# Patient Record
Sex: FEMALE | Race: WHITE | NOT HISPANIC OR LATINO
[De-identification: names, ages, dates, MRNs, and addresses within clinical notes are randomized per-mention and may not be internally consistent; named-entity substitution may affect disease eponyms.]

---

## 2018-07-16 DIAGNOSIS — Z82.3 FAMILY HISTORY OF STROKE: ICD-10-CM

## 2018-07-16 DIAGNOSIS — Z82.49 FAMILY HISTORY OF ISCHEMIC HEART DISEASE AND OTHER DISEASES OF THE CIRCULATORY SYSTEM: ICD-10-CM

## 2018-07-16 DIAGNOSIS — Z86.59 PERSONAL HISTORY OF OTHER MENTAL AND BEHAVIORAL DISORDERS: ICD-10-CM

## 2018-07-20 ENCOUNTER — NON-APPOINTMENT (OUTPATIENT)
Age: 73
End: 2018-07-20

## 2018-07-20 ENCOUNTER — APPOINTMENT (OUTPATIENT)
Dept: CARDIOLOGY | Facility: CLINIC | Age: 73
End: 2018-07-20

## 2018-07-20 VITALS
WEIGHT: 188 LBS | OXYGEN SATURATION: 96 % | HEIGHT: 67 IN | RESPIRATION RATE: 12 BRPM | HEART RATE: 61 BPM | SYSTOLIC BLOOD PRESSURE: 141 MMHG | TEMPERATURE: 97.6 F | BODY MASS INDEX: 29.51 KG/M2 | DIASTOLIC BLOOD PRESSURE: 76 MMHG

## 2018-07-20 DIAGNOSIS — Z86.69 PERSONAL HISTORY OF OTHER DISEASES OF THE NERVOUS SYSTEM AND SENSE ORGANS: ICD-10-CM

## 2018-07-27 ENCOUNTER — APPOINTMENT (OUTPATIENT)
Dept: CARDIOLOGY | Facility: CLINIC | Age: 73
End: 2018-07-27

## 2018-07-27 VITALS
SYSTOLIC BLOOD PRESSURE: 158 MMHG | OXYGEN SATURATION: 97 % | HEART RATE: 71 BPM | WEIGHT: 188 LBS | TEMPERATURE: 97.9 F | DIASTOLIC BLOOD PRESSURE: 81 MMHG | BODY MASS INDEX: 29.51 KG/M2 | HEIGHT: 67 IN

## 2018-08-10 ENCOUNTER — APPOINTMENT (OUTPATIENT)
Dept: CARDIOLOGY | Facility: CLINIC | Age: 73
End: 2018-08-10

## 2018-08-12 LAB
ANION GAP SERPL CALC-SCNC: 14 MMOL/L
BASOPHILS # BLD AUTO: 0.01 K/UL
BASOPHILS NFR BLD AUTO: 0.2 %
BUN SERPL-MCNC: 14 MG/DL
CALCIUM SERPL-MCNC: 9.6 MG/DL
CHLORIDE SERPL-SCNC: 105 MMOL/L
CO2 SERPL-SCNC: 24 MMOL/L
CREAT SERPL-MCNC: 0.86 MG/DL
EOSINOPHIL # BLD AUTO: 0.04 K/UL
EOSINOPHIL NFR BLD AUTO: 0.9 %
GLUCOSE SERPL-MCNC: 106 MG/DL
HCT VFR BLD CALC: 41.2 %
HGB BLD-MCNC: 13.1 G/DL
IMM GRANULOCYTES NFR BLD AUTO: 0.2 %
INR PPP: 1.02 RATIO
LYMPHOCYTES # BLD AUTO: 1.35 K/UL
LYMPHOCYTES NFR BLD AUTO: 29.9 %
MAN DIFF?: NORMAL
MCHC RBC-ENTMCNC: 27.8 PG
MCHC RBC-ENTMCNC: 31.8 GM/DL
MCV RBC AUTO: 87.5 FL
MONOCYTES # BLD AUTO: 0.39 K/UL
MONOCYTES NFR BLD AUTO: 8.6 %
NEUTROPHILS # BLD AUTO: 2.72 K/UL
NEUTROPHILS NFR BLD AUTO: 60.2 %
PLATELET # BLD AUTO: 216 K/UL
POTASSIUM SERPL-SCNC: 4.7 MMOL/L
PT BLD: 11.5 SEC
RBC # BLD: 4.71 M/UL
RBC # FLD: 14.6 %
SODIUM SERPL-SCNC: 143 MMOL/L
WBC # FLD AUTO: 4.52 K/UL

## 2018-08-23 ENCOUNTER — APPOINTMENT (OUTPATIENT)
Dept: CARDIOLOGY | Facility: CLINIC | Age: 73
End: 2018-08-23

## 2018-08-28 ENCOUNTER — TRANSCRIPTION ENCOUNTER (OUTPATIENT)
Age: 73
End: 2018-08-28

## 2018-09-05 ENCOUNTER — MESSAGE (OUTPATIENT)
Age: 73
End: 2018-09-05

## 2018-09-11 ENCOUNTER — APPOINTMENT (OUTPATIENT)
Dept: CARDIOLOGY | Facility: CLINIC | Age: 73
End: 2018-09-11

## 2018-09-11 VITALS
SYSTOLIC BLOOD PRESSURE: 122 MMHG | WEIGHT: 189 LBS | RESPIRATION RATE: 12 BRPM | HEART RATE: 54 BPM | BODY MASS INDEX: 29.6 KG/M2 | OXYGEN SATURATION: 98 % | DIASTOLIC BLOOD PRESSURE: 74 MMHG

## 2018-09-17 ENCOUNTER — TRANSCRIPTION ENCOUNTER (OUTPATIENT)
Age: 73
End: 2018-09-17

## 2018-10-24 ENCOUNTER — MESSAGE (OUTPATIENT)
Age: 73
End: 2018-10-24

## 2018-10-25 ENCOUNTER — MESSAGE (OUTPATIENT)
Age: 73
End: 2018-10-25

## 2018-10-29 ENCOUNTER — MESSAGE (OUTPATIENT)
Age: 73
End: 2018-10-29

## 2018-12-14 ENCOUNTER — MESSAGE (OUTPATIENT)
Age: 73
End: 2018-12-14

## 2018-12-15 ENCOUNTER — TRANSCRIPTION ENCOUNTER (OUTPATIENT)
Age: 73
End: 2018-12-15

## 2018-12-16 ENCOUNTER — RX RENEWAL (OUTPATIENT)
Age: 73
End: 2018-12-16

## 2018-12-20 ENCOUNTER — MESSAGE (OUTPATIENT)
Age: 73
End: 2018-12-20

## 2018-12-26 ENCOUNTER — TRANSCRIPTION ENCOUNTER (OUTPATIENT)
Age: 73
End: 2018-12-26

## 2018-12-31 ENCOUNTER — TRANSCRIPTION ENCOUNTER (OUTPATIENT)
Age: 73
End: 2018-12-31

## 2019-01-10 ENCOUNTER — MESSAGE (OUTPATIENT)
Age: 74
End: 2019-01-10

## 2019-02-01 ENCOUNTER — APPOINTMENT (OUTPATIENT)
Dept: PULMONOLOGY | Facility: CLINIC | Age: 74
End: 2019-02-01
Payer: MEDICARE

## 2019-02-01 VITALS
HEIGHT: 67 IN | DIASTOLIC BLOOD PRESSURE: 78 MMHG | SYSTOLIC BLOOD PRESSURE: 114 MMHG | WEIGHT: 196 LBS | OXYGEN SATURATION: 97 % | HEART RATE: 70 BPM | BODY MASS INDEX: 30.76 KG/M2

## 2019-02-01 PROCEDURE — 99203 OFFICE O/P NEW LOW 30 MIN: CPT

## 2019-02-01 RX ORDER — CHLORHEXIDINE GLUCONATE 4 %
1000 LIQUID (ML) TOPICAL
Refills: 0 | Status: ACTIVE | COMMUNITY

## 2019-02-01 NOTE — REVIEW OF SYSTEMS
[Fatigue] : fatigue [Recent Wt Gain (___ Lbs)] : recent [unfilled] ~Ulb weight gain [Snoring] : snoring [Nocturia] : nocturia [Depression] : depression [Difficulty Maintaining Sleep] : difficulty maintaining sleep [Negative] : Psychiatric [EDS: ESS=____] : no daytime somnolence [Unusual Sleep Behavior] : no unusual sleep behavior [Hypersomnolence] : not sleeping much more than usual [Cataplexy] :  no cataplexy

## 2019-02-01 NOTE — ASSESSMENT
[FreeTextEntry1] : APAP [X]  30 day [] 90 day compliance Dates:  [1/1-1/30] \par Settings: [10] cm F[]  Airfit Touch [] small []Med []  Lg . [X]  Nasal Wisp\par %compliance:[100]  \par Pressure:  95thile%  [10]cm max []cm\par Average use:  [8:18] \par Leak: [] L/ min,  max []L/ min   [] L/ hrs  [X]0% large leak\par AHI = [0]! \par \par

## 2019-02-01 NOTE — HISTORY OF PRESENT ILLNESS
[Obstructive Sleep Apnea] : obstructive sleep apnea [Snoring] : snoring [Witnessed Apneas] : witnessed sleep apnea [Frequent Nocturnal Awakening] : frequent nocturnal awakening [FreeTextEntry1] : PSG/CPAP 2013 not currently available

## 2019-02-01 NOTE — PHYSICAL EXAM
[Well Groomed] : well groomed [General Appearance - In No Acute Distress] : no acute distress [Elongated Uvula] : elongated uvula [Enlarged Base of the Tongue] : enlargement of the base of the tongue [Erythema] : erythema of the pharynx [Retrognathia] : retrognathia [IV] : IV [Neck Appearance] : the appearance of the neck was normal [Neck Cervical Mass (___cm)] : no neck mass was observed [Jugular Venous Distention Increased] : there was no jugular-venous distention [Apical Impulse] : the apical impulse was normal [Heart Rate And Rhythm] : heart rate was normal and rhythm regular [Heart Sounds] : normal S1 and S2 [] : no respiratory distress [Respiration, Rhythm And Depth] : normal respiratory rhythm and effort [Exaggerated Use Of Accessory Muscles For Inspiration] : no accessory muscle use [Tenderness: ____] : tenderness [unfilled] [Abnormal Walk] : normal gait [Nail Clubbing] : no clubbing of the fingernails [Cyanosis, Localized] : no localized cyanosis [Skin Color & Pigmentation] : normal skin color and pigmentation [Cranial Nerves] : cranial nerves 2-12 were intact [Sensation] : the sensory exam was normal to light touch and pinprick [No Focal Deficits] : no focal deficits [Oriented To Time, Place, And Person] : oriented to person, place, and time [Affect] : the affect was normal [Memory Recent] : recent memory was not impaired [FreeTextEntry1] : OBese [FreeTextEntry2] : trace pretibial

## 2019-02-06 ENCOUNTER — APPOINTMENT (OUTPATIENT)
Dept: CARDIOLOGY | Facility: CLINIC | Age: 74
End: 2019-02-06
Payer: MEDICARE

## 2019-02-06 ENCOUNTER — NON-APPOINTMENT (OUTPATIENT)
Age: 74
End: 2019-02-06

## 2019-02-06 VITALS
DIASTOLIC BLOOD PRESSURE: 80 MMHG | BODY MASS INDEX: 29.82 KG/M2 | RESPIRATION RATE: 12 BRPM | TEMPERATURE: 98.2 F | WEIGHT: 190 LBS | HEIGHT: 67 IN | HEART RATE: 60 BPM | SYSTOLIC BLOOD PRESSURE: 130 MMHG | OXYGEN SATURATION: 97 %

## 2019-02-06 DIAGNOSIS — R55 SYNCOPE AND COLLAPSE: ICD-10-CM

## 2019-02-06 DIAGNOSIS — R94.31 ABNORMAL ELECTROCARDIOGRAM [ECG] [EKG]: ICD-10-CM

## 2019-02-06 LAB — EPWORTH SCORE: 1

## 2019-02-06 PROCEDURE — 93298 REM INTERROG DEV EVAL SCRMS: CPT

## 2019-02-06 PROCEDURE — 99214 OFFICE O/P EST MOD 30 MIN: CPT

## 2019-02-06 NOTE — REVIEW OF SYSTEMS
[Palpitations] : palpitations [Negative] : Constitutional [Shortness Of Breath] : no shortness of breath [Dyspnea on exertion] : not dyspnea during exertion [Chest Pain] : no chest pain [Lower Ext Edema] : no extremity edema [Leg Claudication] : no intermittent leg claudication

## 2019-02-06 NOTE — ASSESSMENT
[FreeTextEntry1] : 72 yo female with paroxysmal atrial fibrillation, and Medtronic LINQ loop recorder (implanted 8/23/18 for evaluation of recurrent palpitations and near syncopal episodes). Patient reports intermittent transient episodes of palpitations which resolve after several hours and appear to be associated with periods of stress. \par \par ECG today demonstrated sinus bradycardia with HR 59 bpm, RSR' pattern in V1, possible LA enlargement, and Q waves in inferior leads and V4-6, which are suggestive of possible inferior infarct and anterolateral infarct.\par Lexiscan nuclear stress test on 8/2/18 demonstrated a small area of mild apical inferior ischemia. Given absence of exertional complaints, will continue to monitor clinically for now.\par \par Patient underwent implant of Medtronic LINQ loop recorder on 8/23/18 for further evaluation of her recurrent episodes of palpitations/near syncope in the past. \par Remote interrogation of her loop recorder today through [employer] online portal demonstrated episode of PACs and paroxysm of atrial fibrillation on 1/19/19 which correlated to her symptoms of palpitations. Battery life is OK.\par Will continue current medical management of atrial fibrillation with sotalol 120 mg po bid. QTc on ECG today was normal. \par Will check labs to reassess renal function and lytes.\par Will continue Pradaxa 150 mg po bid for afib thromboembolic prophylaxis. Will check CBC. \par If patient should have persistent or more frequent and prolonged episodes of atrial fibrillation, will consider referral to EP for adjustment of her antiarrhythmic regimen or evaluation for afib ablation.\par \par RTC in 6 months

## 2019-02-06 NOTE — HISTORY OF PRESENT ILLNESS
[FreeTextEntry1] : 74 yo female with paroxysmal atrial fibrillation, and Medtronic LINQ loop recorder (implanted 8/23/18 for evaluation of recurrent palpitations and near syncopal episodes). Patient presents today for follow-up. She reports intermittent episodes of palpitations about once per month since the fall, but denies syncope. These episodes last for several hours before resolving and seem to be associated with periods of stress. She denies chest pain, dyspnea, edema, melena, hematochezia, or hematemesis. She reports also reports intermittent episodes of facial flushing about once per month. She denies nausea, vomiting, or diarrhea.\par \par PMD: Brii Brink MD

## 2019-02-06 NOTE — PHYSICAL EXAM
[General Appearance - Well Developed] : well developed [General Appearance - Well Nourished] : well nourished [General Appearance - In No Acute Distress] : no acute distress [Normal Conjunctiva] : the conjunctiva exhibited no abnormalities [] : no respiratory distress [Respiration, Rhythm And Depth] : normal respiratory rhythm and effort [Auscultation Breath Sounds / Voice Sounds] : lungs were clear to auscultation bilaterally [Bowel Sounds] : normal bowel sounds [Abnormal Walk] : normal gait [Nail Clubbing] : no clubbing of the fingernails [Cyanosis, Localized] : no localized cyanosis [Oriented To Time, Place, And Person] : oriented to person, place, and time [Affect] : the affect was normal [Mood] : the mood was normal [Rhythm Regular] : regular [Normal S1] : normal S1 [Normal S2] : normal S2 [No Murmur] : no murmurs heard [No Pitting Edema] : no pitting edema present [No Oral Cyanosis] : no oral cyanosis [Bradycardia] : bradycardic [FreeTextEntry1] : No JVD present [S3] : no S3 [S4] : no S4 [Right Carotid Bruit] : no bruit heard over the right carotid [Left Carotid Bruit] : no bruit heard over the left carotid

## 2019-02-06 NOTE — REASON FOR VISIT
[Follow-Up - Clinic] : a clinic follow-up of [Atrial Fibrillation] : atrial fibrillation [Palpitations] : palpitations

## 2019-02-07 LAB
ALBUMIN SERPL ELPH-MCNC: 4.6 G/DL
ALP BLD-CCNC: 76 U/L
ALT SERPL-CCNC: 16 U/L
ANION GAP SERPL CALC-SCNC: 15 MMOL/L
AST SERPL-CCNC: 19 U/L
BASOPHILS # BLD AUTO: 0.02 K/UL
BASOPHILS NFR BLD AUTO: 0.3 %
BILIRUB SERPL-MCNC: 0.2 MG/DL
BUN SERPL-MCNC: 22 MG/DL
CALCIUM SERPL-MCNC: 9.4 MG/DL
CHLORIDE SERPL-SCNC: 105 MMOL/L
CO2 SERPL-SCNC: 23 MMOL/L
CREAT SERPL-MCNC: 1.01 MG/DL
EOSINOPHIL # BLD AUTO: 0.07 K/UL
EOSINOPHIL NFR BLD AUTO: 1.1 %
GLUCOSE SERPL-MCNC: 102 MG/DL
HCT VFR BLD CALC: 41.3 %
HGB BLD-MCNC: 13.2 G/DL
IMM GRANULOCYTES NFR BLD AUTO: 0 %
LYMPHOCYTES # BLD AUTO: 1.78 K/UL
LYMPHOCYTES NFR BLD AUTO: 28 %
MAGNESIUM SERPL-MCNC: 2 MG/DL
MAN DIFF?: NORMAL
MCHC RBC-ENTMCNC: 28 PG
MCHC RBC-ENTMCNC: 32 GM/DL
MCV RBC AUTO: 87.5 FL
MONOCYTES # BLD AUTO: 0.62 K/UL
MONOCYTES NFR BLD AUTO: 9.8 %
NEUTROPHILS # BLD AUTO: 3.86 K/UL
NEUTROPHILS NFR BLD AUTO: 60.8 %
PLATELET # BLD AUTO: 217 K/UL
POTASSIUM SERPL-SCNC: 4.4 MMOL/L
PROT SERPL-MCNC: 7 G/DL
RBC # BLD: 4.72 M/UL
RBC # FLD: 13.5 %
SODIUM SERPL-SCNC: 143 MMOL/L
WBC # FLD AUTO: 6.35 K/UL

## 2019-03-08 ENCOUNTER — MESSAGE (OUTPATIENT)
Age: 74
End: 2019-03-08

## 2019-03-22 ENCOUNTER — CLINICAL ADVICE (OUTPATIENT)
Age: 74
End: 2019-03-22

## 2019-04-23 ENCOUNTER — RESULT CHARGE (OUTPATIENT)
Age: 74
End: 2019-04-23

## 2019-05-03 ENCOUNTER — APPOINTMENT (OUTPATIENT)
Dept: GASTROENTEROLOGY | Facility: CLINIC | Age: 74
End: 2019-05-03
Payer: MEDICARE

## 2019-05-03 VITALS
DIASTOLIC BLOOD PRESSURE: 78 MMHG | BODY MASS INDEX: 30.61 KG/M2 | SYSTOLIC BLOOD PRESSURE: 110 MMHG | HEART RATE: 64 BPM | WEIGHT: 195 LBS | HEIGHT: 67 IN

## 2019-05-03 DIAGNOSIS — R19.5 OTHER FECAL ABNORMALITIES: ICD-10-CM

## 2019-05-03 PROCEDURE — 99203 OFFICE O/P NEW LOW 30 MIN: CPT

## 2019-05-03 NOTE — HISTORY OF PRESENT ILLNESS
[de-identified] : CHEMA LYNN  is being evaluated at the request of Dr. Brink for an opinion re: positive cologaurd / colon cancer screening.  Denies nausea, vomiting , fever, chills , diarrhea, constipation, BRBPR, reflux\par

## 2019-05-03 NOTE — PHYSICAL EXAM
[General Appearance - Alert] : alert [Outer Ear] : the ears and nose were normal in appearance [General Appearance - In No Acute Distress] : in no acute distress [Sclera] : the sclera and conjunctiva were normal [] : no respiratory distress [Neck Appearance] : the appearance of the neck was normal [FreeTextEntry1] : deferred [Abnormal Walk] : normal gait [Skin Color & Pigmentation] : normal skin color and pigmentation [Abdomen Soft] : soft [No Focal Deficits] : no focal deficits [Oriented To Time, Place, And Person] : oriented to person, place, and time

## 2019-05-07 ENCOUNTER — NON-APPOINTMENT (OUTPATIENT)
Age: 74
End: 2019-05-07

## 2019-05-07 ENCOUNTER — APPOINTMENT (OUTPATIENT)
Dept: PULMONOLOGY | Facility: CLINIC | Age: 74
End: 2019-05-07
Payer: MEDICARE

## 2019-05-07 ENCOUNTER — APPOINTMENT (OUTPATIENT)
Dept: CARDIOLOGY | Facility: CLINIC | Age: 74
End: 2019-05-07
Payer: MEDICARE

## 2019-05-07 VITALS
WEIGHT: 197 LBS | SYSTOLIC BLOOD PRESSURE: 123 MMHG | BODY MASS INDEX: 30.85 KG/M2 | RESPIRATION RATE: 12 BRPM | DIASTOLIC BLOOD PRESSURE: 69 MMHG | HEART RATE: 72 BPM | OXYGEN SATURATION: 95 %

## 2019-05-07 VITALS
OXYGEN SATURATION: 96 % | HEART RATE: 60 BPM | BODY MASS INDEX: 30.92 KG/M2 | DIASTOLIC BLOOD PRESSURE: 70 MMHG | SYSTOLIC BLOOD PRESSURE: 112 MMHG | WEIGHT: 197 LBS | HEIGHT: 67 IN

## 2019-05-07 DIAGNOSIS — Z86.39 PERSONAL HISTORY OF OTHER ENDOCRINE, NUTRITIONAL AND METABOLIC DISEASE: ICD-10-CM

## 2019-05-07 PROCEDURE — 36415 COLL VENOUS BLD VENIPUNCTURE: CPT

## 2019-05-07 PROCEDURE — 99214 OFFICE O/P EST MOD 30 MIN: CPT

## 2019-05-07 PROCEDURE — 99213 OFFICE O/P EST LOW 20 MIN: CPT | Mod: 25

## 2019-05-07 PROCEDURE — 93000 ELECTROCARDIOGRAM COMPLETE: CPT

## 2019-05-07 NOTE — ASSESSMENT
[FreeTextEntry1] : 74 yo female with paroxysmal atrial fibrillation, and Medtronic LINQ loop recorder (implanted 8/23/18 for evaluation of recurrent palpitations and near syncopal episodes). Patient continues to report intermittent transient episodes of palpitations.\par \par ECG today demonstrated sinus rhythm with RSR' pattern in V1, possible LA enlargement, and Q waves in inferior leads and V4-6, which are suggestive of possible inferolateral infarct. However, this is unchanged compared to 2/6/19 or 7/20/18 ECGs. Lexiscan nuclear stress test on 8/2/18 demonstrated a small area of mild apical inferior ischemia. \par Given absence of exertional complaints, will continue to monitor clinically for now.\par \par Patient underwent implant of Medtronic LINQ loop recorder on 8/23/18 for further evaluation of her recurrent episodes of palpitations/near syncope in the past. \par Remote interrogation of her loop recorder today and on 4/23/19 through [employer] online portal demonstrated no significant episodes of arrhythmias following her last paroxysm of atrial fibrillation on 1/19/19.\par Suspect that her current symptoms may be due to stress/anxiety given absence of arrhythmias despite multiple reported episodes of palpitations over the past several months, but no correlation with arrhythmias per interrogation of loop recorder. \par Patient was encouraged to seek evaluation/management for possible anxiety disorder.\par Stress management was also advised.\par \par Will continue current medical management of atrial fibrillation with sotalol 120 mg po bid. QTc on ECG today was normal. \par Will check labs to reassess CBC, renal function, lytes, TSH, and free T4.\par Will continue Pradaxa 150 mg po bid for afib thromboembolic prophylaxis.\par If patient should have persistent or more frequent and prolonged episodes of atrial fibrillation, will consider referral to EP for adjustment of her antiarrhythmic regimen or evaluation for afib ablation.\par \par Will check vitamin D levels today given history of vitamin D deficiency.

## 2019-05-07 NOTE — REASON FOR VISIT
[Follow-Up - Clinic] : a clinic follow-up of [Atrial Fibrillation] : atrial fibrillation [Palpitations] : palpitations [FreeTextEntry1] : 7

## 2019-05-07 NOTE — PHYSICAL EXAM
[General Appearance - In No Acute Distress] : no acute distress [Well Groomed] : well groomed [Elongated Uvula] : elongated uvula [FreeTextEntry1] : OBese [Enlarged Base of the Tongue] : enlargement of the base of the tongue [Erythema] : erythema of the pharynx [Neck Appearance] : the appearance of the neck was normal [IV] : IV [Retrognathia] : retrognathia [Heart Rate And Rhythm] : heart rate was normal and rhythm regular [Apical Impulse] : the apical impulse was normal [Heart Sounds] : normal S1 and S2 [] : no respiratory distress [Exaggerated Use Of Accessory Muscles For Inspiration] : no accessory muscle use [Respiration, Rhythm And Depth] : normal respiratory rhythm and effort [Abdomen Soft] : soft [Tenderness: ____] : tenderness [unfilled] [Abnormal Walk] : normal gait [Nail Clubbing] : no clubbing of the fingernails [Cyanosis, Localized] : no localized cyanosis [FreeTextEntry2] : trace pretibial [Skin Color & Pigmentation] : normal skin color and pigmentation [Cranial Nerves] : cranial nerves 2-12 were intact [No Focal Deficits] : no focal deficits [Sensation] : the sensory exam was normal to light touch and pinprick [Oriented To Time, Place, And Person] : oriented to person, place, and time [Affect] : the affect was normal [Memory Recent] : recent memory was not impaired

## 2019-05-07 NOTE — PHYSICAL EXAM
[General Appearance - Well Developed] : well developed [General Appearance - In No Acute Distress] : no acute distress [General Appearance - Well Nourished] : well nourished [No Oral Cyanosis] : no oral cyanosis [Normal Conjunctiva] : the conjunctiva exhibited no abnormalities [] : no respiratory distress [Respiration, Rhythm And Depth] : normal respiratory rhythm and effort [Auscultation Breath Sounds / Voice Sounds] : lungs were clear to auscultation bilaterally [Bowel Sounds] : normal bowel sounds [Nail Clubbing] : no clubbing of the fingernails [Abnormal Walk] : normal gait [Cyanosis, Localized] : no localized cyanosis [Oriented To Time, Place, And Person] : oriented to person, place, and time [Affect] : the affect was normal [Mood] : the mood was normal [Rhythm Regular] : regular [Bradycardia] : bradycardic [Normal S1] : normal S1 [Normal S2] : normal S2 [No Murmur] : no murmurs heard [No Pitting Edema] : no pitting edema present [FreeTextEntry1] : No JVD present [S3] : no S3 [Right Carotid Bruit] : no bruit heard over the right carotid [S4] : no S4 [Left Carotid Bruit] : no bruit heard over the left carotid

## 2019-05-07 NOTE — REVIEW OF SYSTEMS
[Fatigue] : fatigue [Recent Wt Gain (___ Lbs)] : recent [unfilled] ~Ulb weight gain [EDS: ESS=____] : no daytime somnolence [Snoring] : snoring [Nocturia] : nocturia [Depression] : depression [Difficulty Maintaining Sleep] : difficulty maintaining sleep [Hypersomnolence] : not sleeping much more than usual [Unusual Sleep Behavior] : no unusual sleep behavior [Cataplexy] :  no cataplexy [Negative] : Gastrointestinal

## 2019-05-07 NOTE — HISTORY OF PRESENT ILLNESS
[FreeTextEntry1] : 74 yo female with paroxysmal atrial fibrillation, and Medtronic LINQ loop recorder (implanted 8/23/18 for evaluation of recurrent palpitations and near syncopal episodes). Patient presents today for follow-up. She continues to report intermittent palpitations with associated lightheadedness, but denies syncope. She continues to report chronic stress. She denies chest pain, dyspnea, edema, melena, hematochezia, or hematemesis. \par \par PMD: Brii Brink MD

## 2019-05-07 NOTE — ASSESSMENT
[FreeTextEntry1] : above was discussed at length with the patient was an excellent understanding of the issues.

## 2019-05-07 NOTE — REVIEW OF SYSTEMS
[Palpitations] : palpitations [Negative] : Heme/Lymph [Shortness Of Breath] : no shortness of breath [Chest Pain] : no chest pain [Dyspnea on exertion] : not dyspnea during exertion [Lower Ext Edema] : no extremity edema [Leg Claudication] : no intermittent leg claudication [Anxiety] : anxiety [Under Stress] : under stress

## 2019-05-07 NOTE — HISTORY OF PRESENT ILLNESS
[FreeTextEntry1] : The patient returns for followup of her NIR. He is feeling well and likes her new machine. It looks nicer and quieter. She is sleeping anywhere from 7.5-8 hours per night and no longer feels tired during the daytime. She has not lost any weight although she is committed. her original PSG from [facility] [place] is now available.  [Obstructive Sleep Apnea] : obstructive sleep apnea [Witnessed Apneas] : witnessed sleep apnea [Snoring] : snoring [Date: ___] : Date of most recent diagnostic polysomnogram: [unfilled] [Frequent Nocturnal Awakening] : frequent nocturnal awakening [Marshall desatuation%: ___] : Marshall desaturation:  [unfilled]% [AHI: ___ per hour] : Apnea-hypopnea index:  [unfilled] per hour

## 2019-05-08 ENCOUNTER — MESSAGE (OUTPATIENT)
Age: 74
End: 2019-05-08

## 2019-05-08 LAB
24R-OH-CALCIDIOL SERPL-MCNC: 75.1 PG/ML
ALBUMIN SERPL ELPH-MCNC: 4.5 G/DL
ALP BLD-CCNC: 78 U/L
ALT SERPL-CCNC: 22 U/L
ANION GAP SERPL CALC-SCNC: 15 MMOL/L
AST SERPL-CCNC: 22 U/L
BASOPHILS # BLD AUTO: 0.05 K/UL
BASOPHILS NFR BLD AUTO: 0.8 %
BILIRUB SERPL-MCNC: 0.4 MG/DL
BUN SERPL-MCNC: 13 MG/DL
CALCIUM SERPL-MCNC: 9.6 MG/DL
CHLORIDE SERPL-SCNC: 107 MMOL/L
CO2 SERPL-SCNC: 23 MMOL/L
CREAT SERPL-MCNC: 0.77 MG/DL
EOSINOPHIL # BLD AUTO: 0.07 K/UL
EOSINOPHIL NFR BLD AUTO: 1.2 %
GLUCOSE SERPL-MCNC: 119 MG/DL
HCT VFR BLD CALC: 43.4 %
HGB BLD-MCNC: 13.9 G/DL
IMM GRANULOCYTES NFR BLD AUTO: 0.2 %
LYMPHOCYTES # BLD AUTO: 1.6 K/UL
LYMPHOCYTES NFR BLD AUTO: 27.2 %
MAN DIFF?: NORMAL
MCHC RBC-ENTMCNC: 28.4 PG
MCHC RBC-ENTMCNC: 32 GM/DL
MCV RBC AUTO: 88.6 FL
MONOCYTES # BLD AUTO: 0.62 K/UL
MONOCYTES NFR BLD AUTO: 10.5 %
NEUTROPHILS # BLD AUTO: 3.54 K/UL
NEUTROPHILS NFR BLD AUTO: 60.1 %
PLATELET # BLD AUTO: 256 K/UL
POTASSIUM SERPL-SCNC: 4.8 MMOL/L
PROT SERPL-MCNC: 7.3 G/DL
RBC # BLD: 4.9 M/UL
RBC # FLD: 13.5 %
SODIUM SERPL-SCNC: 145 MMOL/L
T4 FREE SERPL-MCNC: 1.1 NG/DL
TSH SERPL-ACNC: 1.63 UIU/ML
WBC # FLD AUTO: 5.89 K/UL

## 2019-05-30 ENCOUNTER — MESSAGE (OUTPATIENT)
Age: 74
End: 2019-05-30

## 2019-06-07 ENCOUNTER — MESSAGE (OUTPATIENT)
Age: 74
End: 2019-06-07

## 2019-07-02 ENCOUNTER — APPOINTMENT (OUTPATIENT)
Dept: CARDIOLOGY | Facility: CLINIC | Age: 74
End: 2019-07-02

## 2019-07-02 ENCOUNTER — MESSAGE (OUTPATIENT)
Age: 74
End: 2019-07-02

## 2019-07-04 ENCOUNTER — MESSAGE (OUTPATIENT)
Age: 74
End: 2019-07-04

## 2019-07-08 ENCOUNTER — APPOINTMENT (OUTPATIENT)
Dept: HEART AND VASCULAR | Facility: CLINIC | Age: 74
End: 2019-07-08
Payer: MEDICARE

## 2019-07-08 VITALS
HEART RATE: 58 BPM | HEIGHT: 67 IN | DIASTOLIC BLOOD PRESSURE: 85 MMHG | SYSTOLIC BLOOD PRESSURE: 130 MMHG | BODY MASS INDEX: 29.98 KG/M2 | WEIGHT: 191 LBS

## 2019-07-08 PROCEDURE — 99205 OFFICE O/P NEW HI 60 MIN: CPT | Mod: 25

## 2019-07-08 PROCEDURE — 93297 REM INTERROG DEV EVAL ICPMS: CPT

## 2019-07-08 PROCEDURE — 93000 ELECTROCARDIOGRAM COMPLETE: CPT

## 2019-07-08 RX ORDER — SOTALOL HYDROCHLORIDE 80 MG/1
80 TABLET ORAL TWICE DAILY
Qty: 270 | Refills: 3 | Status: DISCONTINUED | COMMUNITY
Start: 2018-12-16 | End: 2019-07-08

## 2019-07-09 ENCOUNTER — TRANSCRIPTION ENCOUNTER (OUTPATIENT)
Age: 74
End: 2019-07-09

## 2019-07-09 ENCOUNTER — APPOINTMENT (OUTPATIENT)
Dept: PULMONOLOGY | Facility: CLINIC | Age: 74
End: 2019-07-09
Payer: MEDICARE

## 2019-07-09 VITALS
HEART RATE: 58 BPM | HEIGHT: 67 IN | BODY MASS INDEX: 29.98 KG/M2 | OXYGEN SATURATION: 98 % | DIASTOLIC BLOOD PRESSURE: 70 MMHG | SYSTOLIC BLOOD PRESSURE: 116 MMHG | WEIGHT: 191 LBS

## 2019-07-09 LAB — EPWORTH SCORE: 5

## 2019-07-09 PROCEDURE — 99213 OFFICE O/P EST LOW 20 MIN: CPT

## 2019-07-18 NOTE — HISTORY OF PRESENT ILLNESS
[FreeTextEntry1] : Ms. BAILEY is a 73 year female who presents for follow up of sleep apnea. She has been using CPAP every night. She occasionally finds herself nodding off when she's watching TV or she's resting during the day ([place] 5). She is also working with her cardiologist to wean off of Sotalol because it may be the cause of her fatigue. \par She complains that her mask is not fitting well. She finds that it leaks more than her previous mask (Wisp small). She currently has a N20 Med. The patient follows a good sleep schedule and has good sleep hygiene. She gets 8-9 hrs of sleep per \par

## 2019-07-18 NOTE — PHYSICAL EXAM
[General Appearance - Well Developed] : well developed [Normal Appearance] : normal appearance [General Appearance - In No Acute Distress] : no acute distress [Elongated Uvula] : elongated uvula [Enlarged Base of the Tongue] : enlargement of the base of the tongue [Neck Appearance] : the appearance of the neck was normal [IV] : IV [Neck Cervical Mass (___cm)] : no neck mass was observed [Jugular Venous Distention Increased] : there was no jugular-venous distention [Murmurs] : no murmurs [Respiration, Rhythm And Depth] : normal respiratory rhythm and effort [Auscultation Breath Sounds / Voice Sounds] : lungs were clear to auscultation bilaterally [Bowel Sounds] : normal bowel sounds [Abdomen Soft] : soft [Abnormal Walk] : normal gait [Nail Clubbing] : no clubbing  or cyanosis of the fingernails [Skin Color & Pigmentation] : normal skin color and pigmentation [] : no rash [Cranial Nerves] : cranial nerves 2-12 were intact [No Focal Deficits] : no focal deficits [Oriented To Time, Place, And Person] : oriented to person, place, and time [Affect] : the affect was normal [FreeTextEntry1] : HR reg 60, no edema

## 2019-07-19 NOTE — ASSESSMENT
[FreeTextEntry1] : 74 yo female with paroxysmal atrial fibrillation, and pauses during sinus. No significant conversion pauses. Unclear if sinus pauses in setting of apnea. Also concerned about undersensing as R wave is small on ILR interrogation. \par We discussed the concept of tachy-yoselyn syndrome, therapeutic options of pacemaker if pauses persist and need for b-blockers or antiarrhtyhmics and also ablation for atrial fibrillation. I emphasized the importance of using CPAP, weight control in treating both pauses and atrial fibrillation. I added that healthy life style compliments the effects of medical therapy and ablation. Patient wishes to think of her options.

## 2019-07-19 NOTE — PHYSICAL EXAM
[General Appearance - Well Developed] : well developed [General Appearance - Well Nourished] : well nourished [General Appearance - In No Acute Distress] : no acute distress [Normal Conjunctiva] : the conjunctiva exhibited no abnormalities [No Oral Cyanosis] : no oral cyanosis [FreeTextEntry1] : No JVD present [] : no respiratory distress [Respiration, Rhythm And Depth] : normal respiratory rhythm and effort [Auscultation Breath Sounds / Voice Sounds] : lungs were clear to auscultation bilaterally [Bowel Sounds] : normal bowel sounds [Abnormal Walk] : normal gait [Nail Clubbing] : no clubbing of the fingernails [Cyanosis, Localized] : no localized cyanosis [Oriented To Time, Place, And Person] : oriented to person, place, and time [Affect] : the affect was normal [Mood] : the mood was normal [Bradycardia] : bradycardic [Rhythm Regular] : regular [Normal S1] : normal S1 [Normal S2] : normal S2 [S3] : no S3 [S4] : no S4 [No Murmur] : no murmurs heard [Right Carotid Bruit] : no bruit heard over the right carotid [Left Carotid Bruit] : no bruit heard over the left carotid [No Pitting Edema] : no pitting edema present

## 2019-07-19 NOTE — REVIEW OF SYSTEMS
[Feeling Fatigued] : feeling fatigued [Shortness Of Breath] : no shortness of breath [Dyspnea on exertion] : not dyspnea during exertion [Chest Pain] : no chest pain [Lower Ext Edema] : no extremity edema [Leg Claudication] : no intermittent leg claudication [Palpitations] : palpitations [Anxiety] : anxiety [Under Stress] : under stress [Negative] : Heme/Lymph

## 2019-07-19 NOTE — HISTORY OF PRESENT ILLNESS
[FreeTextEntry1] : 72 yo female with obesity, sleep apnea (awaiting CPAP) and paroxysmal atrial fibrillation post implantation of loop recorder (implanted 8/23/18 for evaluation of recurrent palpitations and near syncopal episodes)\par presents for evaluation of Afib and sinus pauses. Afib symptomatic with palpitations and patient does complain of dizziness. She also complains of lightheadedness mostly unrelated to her pauses but denies syncope. She continues to report chronic stress. She denies chest pain, dyspnea, edema, melena, hematochezia, or hematemesis. Bblockers are discontinued due to pauses, she is complaint with anticoagulation. \par \par

## 2019-08-05 ENCOUNTER — APPOINTMENT (OUTPATIENT)
Dept: HEART AND VASCULAR | Facility: CLINIC | Age: 74
End: 2019-08-05

## 2019-08-05 ENCOUNTER — APPOINTMENT (OUTPATIENT)
Dept: HEART AND VASCULAR | Facility: CLINIC | Age: 74
End: 2019-08-05
Payer: MEDICARE

## 2019-08-05 VITALS
WEIGHT: 193 LBS | SYSTOLIC BLOOD PRESSURE: 140 MMHG | BODY MASS INDEX: 30.29 KG/M2 | DIASTOLIC BLOOD PRESSURE: 80 MMHG | HEART RATE: 99 BPM | HEIGHT: 67 IN

## 2019-08-05 PROCEDURE — 93000 ELECTROCARDIOGRAM COMPLETE: CPT | Mod: 59

## 2019-08-05 PROCEDURE — 93285 PRGRMG DEV EVAL SCRMS IP: CPT

## 2019-08-05 PROCEDURE — 99214 OFFICE O/P EST MOD 30 MIN: CPT | Mod: 25

## 2019-08-10 NOTE — HISTORY OF PRESENT ILLNESS
[FreeTextEntry1] : 72 yo female with obesity, sleep apnea (awaiting CPAP) and paroxysmal atrial fibrillation post implantation of loop recorder (implanted 8/23/18 for evaluation of recurrent palpitations and near syncopal episodes)\par presents for evaluation of Afib and sinus pauses.\par \par SInce last visit we are weaning her of Sotalol due to long pauses detected on loop recorder. She brings a dairy with her palpitations and claims that palpiations now occur more frequently than before. \par \par  She denies dizziness or lightheadedness lately. no syncope. She denies chest pain, dyspnea, edema, melena, hematochezia, or hematemesis. She is complaint with anticoagulation. \par \par \par

## 2019-08-10 NOTE — REASON FOR VISIT
[Atrial Fibrillation] : atrial fibrillation [Follow-Up - Clinic] : a clinic follow-up of [Palpitations] : palpitations

## 2019-08-10 NOTE — REVIEW OF SYSTEMS
[Shortness Of Breath] : no shortness of breath [Feeling Fatigued] : feeling fatigued [Chest Pain] : no chest pain [Dyspnea on exertion] : not dyspnea during exertion [Lower Ext Edema] : no extremity edema [Leg Claudication] : no intermittent leg claudication [Palpitations] : palpitations [Anxiety] : anxiety [Under Stress] : under stress [Negative] : Heme/Lymph

## 2019-08-10 NOTE — ASSESSMENT
[FreeTextEntry1] : 72 yo female with paroxysmal atrial fibrillation, and pauses during sinus.Off bblockers and off Sotalol. She claims that palpitaitons are more frequent. \par \par Device interrogations shows no more pauses, multiple episodes that are labeled as AF. Due to low R wave amplitude of and unreliable P wave it is challenging to adjudicate the recorded episodes, however marked RR variability suggest presence of atrial fibrillation. \par \par We discussed the concept of tachy-yoselyn syndrome, therapeutic options of pacemaker if pauses persist and need for b-blockers or antiarrhtyhmics and also ablation for atrial fibrillation.She is interested but not committed to the idea of PVI to possibly obviate the need for pacemaker, especially since AF episodes are more symptomatic and frequent off bblocker and Sotalol. i quoted a 70% chance of success first ablation and a 1:800 chance of a major complication related to the procedure. We also discussed about the use of RF balloon (investigational) and enrolment in the study. \par \par  I emphasized the importance of using CPAP, weight control in treating both pauses and atrial fibrillation. I added that healthy life style compliments the effects of medical therapy and ablation. Patient wishes to think of her options and call the office. 
87

## 2019-08-10 NOTE — PHYSICAL EXAM
[General Appearance - Well Nourished] : well nourished [General Appearance - Well Developed] : well developed [Normal Conjunctiva] : the conjunctiva exhibited no abnormalities [General Appearance - In No Acute Distress] : no acute distress [No Oral Cyanosis] : no oral cyanosis [] : no respiratory distress [FreeTextEntry1] : No JVD present [Auscultation Breath Sounds / Voice Sounds] : lungs were clear to auscultation bilaterally [Respiration, Rhythm And Depth] : normal respiratory rhythm and effort [Abnormal Walk] : normal gait [Bowel Sounds] : normal bowel sounds [Cyanosis, Localized] : no localized cyanosis [Nail Clubbing] : no clubbing of the fingernails [Affect] : the affect was normal [Oriented To Time, Place, And Person] : oriented to person, place, and time [Bradycardia] : bradycardic [Mood] : the mood was normal [Normal S1] : normal S1 [Rhythm Regular] : regular [Normal S2] : normal S2 [No Murmur] : no murmurs heard [S4] : no S4 [S3] : no S3 [Left Carotid Bruit] : no bruit heard over the left carotid [Right Carotid Bruit] : no bruit heard over the right carotid [No Pitting Edema] : no pitting edema present

## 2019-08-27 ENCOUNTER — FORM ENCOUNTER (OUTPATIENT)
Age: 74
End: 2019-08-27

## 2019-08-28 ENCOUNTER — OUTPATIENT (OUTPATIENT)
Dept: OUTPATIENT SERVICES | Facility: HOSPITAL | Age: 74
LOS: 1 days | End: 2019-08-28
Payer: MEDICARE

## 2019-08-28 ENCOUNTER — APPOINTMENT (OUTPATIENT)
Dept: CT IMAGING | Facility: HOSPITAL | Age: 74
End: 2019-08-28
Payer: MEDICARE

## 2019-08-28 PROCEDURE — 75572 CT HRT W/3D IMAGE: CPT

## 2019-08-28 PROCEDURE — 75572 CT HRT W/3D IMAGE: CPT | Mod: 26

## 2019-09-03 ENCOUNTER — APPOINTMENT (OUTPATIENT)
Dept: CARDIOLOGY | Facility: CLINIC | Age: 74
End: 2019-09-03
Payer: MEDICARE

## 2019-09-03 PROCEDURE — 93306 TTE W/DOPPLER COMPLETE: CPT

## 2019-09-04 ENCOUNTER — MESSAGE (OUTPATIENT)
Age: 74
End: 2019-09-04

## 2019-09-10 ENCOUNTER — INPATIENT (INPATIENT)
Facility: HOSPITAL | Age: 74
LOS: 0 days | Discharge: HOME | DRG: 274 | End: 2019-09-11
Attending: INTERNAL MEDICINE | Admitting: INTERNAL MEDICINE
Payer: MEDICARE

## 2019-09-10 VITALS — WEIGHT: 190.04 LBS | HEIGHT: 67 IN

## 2019-09-10 DIAGNOSIS — G47.33 OBSTRUCTIVE SLEEP APNEA (ADULT) (PEDIATRIC): ICD-10-CM

## 2019-09-10 DIAGNOSIS — Z96.641 PRESENCE OF RIGHT ARTIFICIAL HIP JOINT: Chronic | ICD-10-CM

## 2019-09-10 DIAGNOSIS — I48.0 PAROXYSMAL ATRIAL FIBRILLATION: ICD-10-CM

## 2019-09-10 LAB
ANION GAP SERPL CALC-SCNC: 14 MMOL/L — SIGNIFICANT CHANGE UP (ref 5–17)
APTT BLD: 57.6 SEC — HIGH (ref 27.5–36.3)
BLD GP AB SCN SERPL QL: NEGATIVE — SIGNIFICANT CHANGE UP
BUN SERPL-MCNC: 16 MG/DL — SIGNIFICANT CHANGE UP (ref 7–23)
CALCIUM SERPL-MCNC: 9.6 MG/DL — SIGNIFICANT CHANGE UP (ref 8.4–10.5)
CHLORIDE SERPL-SCNC: 104 MMOL/L — SIGNIFICANT CHANGE UP (ref 96–108)
CO2 SERPL-SCNC: 23 MMOL/L — SIGNIFICANT CHANGE UP (ref 22–31)
CREAT SERPL-MCNC: 0.88 MG/DL — SIGNIFICANT CHANGE UP (ref 0.5–1.3)
GLUCOSE SERPL-MCNC: 114 MG/DL — HIGH (ref 70–99)
HCT VFR BLD CALC: 43.8 % — SIGNIFICANT CHANGE UP (ref 34.5–45)
HGB BLD-MCNC: 14.3 G/DL — SIGNIFICANT CHANGE UP (ref 11.5–15.5)
INR BLD: 1.23 — HIGH (ref 0.88–1.16)
MCHC RBC-ENTMCNC: 27.8 PG — SIGNIFICANT CHANGE UP (ref 27–34)
MCHC RBC-ENTMCNC: 32.6 GM/DL — SIGNIFICANT CHANGE UP (ref 32–36)
MCV RBC AUTO: 85.2 FL — SIGNIFICANT CHANGE UP (ref 80–100)
NRBC # BLD: 0 /100 WBCS — SIGNIFICANT CHANGE UP (ref 0–0)
PLATELET # BLD AUTO: 212 K/UL — SIGNIFICANT CHANGE UP (ref 150–400)
POTASSIUM SERPL-MCNC: 3.9 MMOL/L — SIGNIFICANT CHANGE UP (ref 3.5–5.3)
POTASSIUM SERPL-SCNC: 3.9 MMOL/L — SIGNIFICANT CHANGE UP (ref 3.5–5.3)
PROTHROM AB SERPL-ACNC: 14 SEC — HIGH (ref 10–12.9)
RBC # BLD: 5.14 M/UL — SIGNIFICANT CHANGE UP (ref 3.8–5.2)
RBC # FLD: 13 % — SIGNIFICANT CHANGE UP (ref 10.3–14.5)
RH IG SCN BLD-IMP: POSITIVE — SIGNIFICANT CHANGE UP
SODIUM SERPL-SCNC: 141 MMOL/L — SIGNIFICANT CHANGE UP (ref 135–145)
WBC # BLD: 6.21 K/UL — SIGNIFICANT CHANGE UP (ref 3.8–10.5)
WBC # FLD AUTO: 6.21 K/UL — SIGNIFICANT CHANGE UP (ref 3.8–10.5)

## 2019-09-10 PROCEDURE — 93613 INTRACARDIAC EPHYS 3D MAPG: CPT

## 2019-09-10 PROCEDURE — 93662 INTRACARDIAC ECG (ICE): CPT | Mod: 26

## 2019-09-10 PROCEDURE — 99223 1ST HOSP IP/OBS HIGH 75: CPT

## 2019-09-10 PROCEDURE — 93653 COMPRE EP EVAL TX SVT: CPT

## 2019-09-10 PROCEDURE — 93623 PRGRMD STIMJ&PACG IV RX NFS: CPT | Mod: 26

## 2019-09-10 RX ORDER — DABIGATRAN ETEXILATE MESYLATE 150 MG/1
1 CAPSULE ORAL
Qty: 0 | Refills: 0 | DISCHARGE

## 2019-09-10 RX ORDER — CHOLECALCIFEROL (VITAMIN D3) 125 MCG
0 CAPSULE ORAL
Qty: 0 | Refills: 0 | DISCHARGE

## 2019-09-10 RX ORDER — DABIGATRAN ETEXILATE MESYLATE 150 MG/1
150 CAPSULE ORAL EVERY 12 HOURS
Refills: 0 | Status: DISCONTINUED | OUTPATIENT
Start: 2019-09-10 | End: 2019-09-11

## 2019-09-10 RX ORDER — PREGABALIN 225 MG/1
1 CAPSULE ORAL
Qty: 0 | Refills: 0 | DISCHARGE

## 2019-09-10 RX ADMIN — DABIGATRAN ETEXILATE MESYLATE 150 MILLIGRAM(S): 150 CAPSULE ORAL at 22:12

## 2019-09-10 NOTE — H&P ADULT - ASSESSMENT
74 yo F with history of obesity, sleep apnea on CPAP, paroxysmal atrial fibrillation, s/p ILR presents for scheduled ablation.

## 2019-09-10 NOTE — PROGRESS NOTE ADULT - SUBJECTIVE AND OBJECTIVE BOX
CHEMA BAILEY  [id number]    PROCEDURE:  Atrial Fibrillation Ablation  EP Study        INDICATION:  - Symptomatic afib        ELECTROPHYSIOLOGIST(S):  MD Sandy          FINDINGS:  - Successful bilateral PVI       COMPLICATIONS:  - None      RECOMMENDATIONS:  - Resume Pradaxa in 6 hours  - Bed rest for 6 hours  - Admit to inpatient to monitor post intubation given NIR.

## 2019-09-10 NOTE — H&P ADULT - HISTORY OF PRESENT ILLNESS
74 yo F with history of obesity, sleep apnea on CPAP, paroxysmal atrial fibrillation, s/p ILR presents for scheduled ablation.   Patient has been on Sotalol , but she stopped about one month ago because of pauses noted on ILR reports. Patient states that  she has more frequent episodes of palpitations since Sotalol was stopped. Denies chest pain, SOB, dizziness, syncope.   Patient is compliant with Pradaxa, no bleeding issues.

## 2019-09-10 NOTE — PATIENT PROFILE ADULT - NSPROEDALEARNPREF_GEN_A_NUR
verbal instruction/individual instruction/group instruction/audio/computer/internet/video/written material/pictorial/skill demonstration

## 2019-09-11 ENCOUNTER — TRANSCRIPTION ENCOUNTER (OUTPATIENT)
Age: 74
End: 2019-09-11

## 2019-09-11 VITALS — TEMPERATURE: 99 F

## 2019-09-11 LAB
HCV AB S/CO SERPL IA: 0.11 S/CO — SIGNIFICANT CHANGE UP
HCV AB SERPL-IMP: SIGNIFICANT CHANGE UP

## 2019-09-11 PROCEDURE — 99239 HOSP IP/OBS DSCHRG MGMT >30: CPT

## 2019-09-11 RX ADMIN — DABIGATRAN ETEXILATE MESYLATE 150 MILLIGRAM(S): 150 CAPSULE ORAL at 18:25

## 2019-09-11 RX ADMIN — DABIGATRAN ETEXILATE MESYLATE 150 MILLIGRAM(S): 150 CAPSULE ORAL at 10:45

## 2019-09-11 NOTE — DISCHARGE NOTE NURSING/CASE MANAGEMENT/SOCIAL WORK - PATIENT PORTAL LINK FT
You can access the FollowMyHealth Patient Portal offered by [facility] by registering at the following website: [email or web address]. By joining [employer]’s FollowMyHealth portal, you will also be able to view your health information using other applications (apps) compatible with our system.

## 2019-09-11 NOTE — DISCHARGE NOTE PROVIDER - NSDCCPTREATMENT_GEN_ALL_CORE_FT
PRINCIPAL PROCEDURE  Procedure: Electrophysiology study with ablation of focus atrial fibrillation  Findings and Treatment:

## 2019-09-11 NOTE — DISCHARGE NOTE PROVIDER - HOSPITAL COURSE
74 yo F with history of obesity, sleep apnea on CPAP, paroxysmal atrial fibrillation, s/p ILR presents for scheduled ablation.     Patient has been on Sotalol , but she stopped about one month ago because of pauses noted on ILR reports. Patient states that  she has more frequent episodes of palpitations since Sotalol was stopped. Denies chest pain, SOB, dizziness, syncope.     Patient is compliant with Pradaxa, no bleeding issues.       Patient has successful PVI on 9/10/19, there were no complications, patient maintaining sinus rhythm. BL groin check no bleeding, swelling, hematoma    Patient was stable for discharge.

## 2019-09-11 NOTE — DISCHARGE NOTE PROVIDER - NSDCFUADDAPPT_GEN_ALL_CORE_FT
Follow up Dr. Delgado at [place] office with in one month call to schedule an appointment at [phone]

## 2019-09-11 NOTE — DISCHARGE NOTE NURSING/CASE MANAGEMENT/SOCIAL WORK - NSDCFUADDAPPT_GEN_ALL_CORE_FT
Follow up Dr. Delgado at [place] office with in one month call to schedule an appointment at [phone]

## 2019-09-11 NOTE — DISCHARGE NOTE PROVIDER - CARE PROVIDER_API CALL
Epifanio Delgado)  Cardiac Electrophysiology; Cardiology; Cardiovascular Disease  [address], [address] [place] [address]  Phone: [phone]  Fax: [phone]  Follow Up Time:

## 2019-09-12 PROBLEM — E66.9 OBESITY, UNSPECIFIED: Chronic | Status: ACTIVE | Noted: 2019-09-10

## 2019-09-12 PROBLEM — I48.0 PAROXYSMAL ATRIAL FIBRILLATION: Chronic | Status: ACTIVE | Noted: 2019-09-10

## 2019-09-12 PROBLEM — G47.33 OBSTRUCTIVE SLEEP APNEA (ADULT) (PEDIATRIC): Chronic | Status: ACTIVE | Noted: 2019-09-10

## 2019-09-19 DIAGNOSIS — Z79.01 LONG TERM (CURRENT) USE OF ANTICOAGULANTS: ICD-10-CM

## 2019-09-19 DIAGNOSIS — Z95.818 PRESENCE OF OTHER CARDIAC IMPLANTS AND GRAFTS: ICD-10-CM

## 2019-09-19 DIAGNOSIS — G47.33 OBSTRUCTIVE SLEEP APNEA (ADULT) (PEDIATRIC): ICD-10-CM

## 2019-09-19 DIAGNOSIS — Z88.2 ALLERGY STATUS TO SULFONAMIDES: ICD-10-CM

## 2019-09-19 DIAGNOSIS — Z96.641 PRESENCE OF RIGHT ARTIFICIAL HIP JOINT: ICD-10-CM

## 2019-09-19 DIAGNOSIS — I48.0 PAROXYSMAL ATRIAL FIBRILLATION: ICD-10-CM

## 2019-09-19 DIAGNOSIS — I48.91 UNSPECIFIED ATRIAL FIBRILLATION: ICD-10-CM

## 2019-10-08 PROCEDURE — C1730: CPT

## 2019-10-08 PROCEDURE — C1732: CPT

## 2019-10-08 PROCEDURE — 36415 COLL VENOUS BLD VENIPUNCTURE: CPT

## 2019-10-08 PROCEDURE — 85610 PROTHROMBIN TIME: CPT

## 2019-10-08 PROCEDURE — 85730 THROMBOPLASTIN TIME PARTIAL: CPT

## 2019-10-08 PROCEDURE — 80048 BASIC METABOLIC PNL TOTAL CA: CPT

## 2019-10-08 PROCEDURE — 86803 HEPATITIS C AB TEST: CPT

## 2019-10-08 PROCEDURE — 85027 COMPLETE CBC AUTOMATED: CPT

## 2019-10-08 PROCEDURE — C1894: CPT

## 2019-10-08 PROCEDURE — C1759: CPT

## 2019-10-08 PROCEDURE — C1731: CPT

## 2019-10-08 PROCEDURE — C1766: CPT

## 2019-10-08 PROCEDURE — 86850 RBC ANTIBODY SCREEN: CPT

## 2019-10-08 PROCEDURE — 86900 BLOOD TYPING SEROLOGIC ABO: CPT

## 2019-10-08 PROCEDURE — 86901 BLOOD TYPING SEROLOGIC RH(D): CPT

## 2019-11-04 ENCOUNTER — APPOINTMENT (OUTPATIENT)
Dept: HEART AND VASCULAR | Facility: CLINIC | Age: 74
End: 2019-11-04
Payer: MEDICARE

## 2019-11-04 VITALS
WEIGHT: 194 LBS | DIASTOLIC BLOOD PRESSURE: 78 MMHG | BODY MASS INDEX: 30.45 KG/M2 | HEIGHT: 67 IN | SYSTOLIC BLOOD PRESSURE: 126 MMHG | HEART RATE: 94 BPM

## 2019-11-04 PROCEDURE — 99213 OFFICE O/P EST LOW 20 MIN: CPT | Mod: 25

## 2019-11-04 PROCEDURE — 93285 PRGRMG DEV EVAL SCRMS IP: CPT

## 2019-11-04 NOTE — REVIEW OF SYSTEMS
[Feeling Fatigued] : feeling fatigued [Palpitations] : palpitations [Anxiety] : anxiety [Under Stress] : under stress [Negative] : Heme/Lymph [Shortness Of Breath] : no shortness of breath [Dyspnea on exertion] : not dyspnea during exertion [Chest Pain] : no chest pain [Lower Ext Edema] : no extremity edema [Leg Claudication] : no intermittent leg claudication

## 2019-11-04 NOTE — PHYSICAL EXAM
[General Appearance - Well Developed] : well developed [General Appearance - Well Nourished] : well nourished [General Appearance - In No Acute Distress] : no acute distress [Normal Conjunctiva] : the conjunctiva exhibited no abnormalities [No Oral Cyanosis] : no oral cyanosis [] : no respiratory distress [Respiration, Rhythm And Depth] : normal respiratory rhythm and effort [Auscultation Breath Sounds / Voice Sounds] : lungs were clear to auscultation bilaterally [Bowel Sounds] : normal bowel sounds [Abnormal Walk] : normal gait [Nail Clubbing] : no clubbing of the fingernails [Cyanosis, Localized] : no localized cyanosis [Oriented To Time, Place, And Person] : oriented to person, place, and time [Affect] : the affect was normal [Mood] : the mood was normal [Bradycardia] : bradycardic [Rhythm Regular] : regular [Normal S1] : normal S1 [Normal S2] : normal S2 [No Murmur] : no murmurs heard [No Pitting Edema] : no pitting edema present [FreeTextEntry1] : No JVD present [S3] : no S3 [S4] : no S4 [Right Carotid Bruit] : no bruit heard over the right carotid [Left Carotid Bruit] : no bruit heard over the left carotid

## 2019-11-04 NOTE — HISTORY OF PRESENT ILLNESS
[FreeTextEntry1] : 72 yo female with obesity, sleep apnea and paroxysmal atrial fibrillation post implantation of loop recorder (implanted 8/23/18 for evaluation of recurrent palpitations and near syncopal episodes)\par presents for evaluation of Afib after ablation 9/10/2019\par She feels great, no more episodes of palpitations, she is off Sotalol and bblockers only on anticoagulation. \par \par  She denies dizziness or lightheadedness lately. no syncope. She denies chest pain, dyspnea, edema, melena, hematochezia, or hematemesis. \par \par

## 2019-11-04 NOTE — ASSESSMENT
[FreeTextEntry1] : Device interrogations shows no more pauses and no more atrial fibrillation. Multiple episodes that are labeled as tachcyardia consistent with double counting of the T wave.  \par \par She is doing great post ablation with complete symptom resolution. Will continue anticoagulation. \par \par I emphasized the importance of using CPAP, weight control in treating both pauses and atrial fibrillation. I added that healthy life style compliments the effects of medical therapy and ablation. \par \larry Will see her back in 4 months.

## 2020-01-05 ENCOUNTER — RESULT REVIEW (OUTPATIENT)
Age: 75
End: 2020-01-05

## 2020-01-06 ENCOUNTER — APPOINTMENT (OUTPATIENT)
Dept: GASTROENTEROLOGY | Facility: HOSPITAL | Age: 75
End: 2020-01-06

## 2020-02-14 ENCOUNTER — APPOINTMENT (OUTPATIENT)
Dept: CARDIOLOGY | Facility: CLINIC | Age: 75
End: 2020-02-14
Payer: MEDICARE

## 2020-02-14 VITALS
HEART RATE: 85 BPM | DIASTOLIC BLOOD PRESSURE: 80 MMHG | RESPIRATION RATE: 12 BRPM | SYSTOLIC BLOOD PRESSURE: 112 MMHG | OXYGEN SATURATION: 97 % | WEIGHT: 197 LBS | BODY MASS INDEX: 30.85 KG/M2

## 2020-02-14 PROCEDURE — 93000 ELECTROCARDIOGRAM COMPLETE: CPT | Mod: 59

## 2020-02-14 PROCEDURE — 99213 OFFICE O/P EST LOW 20 MIN: CPT

## 2020-02-14 PROCEDURE — 93298 REM INTERROG DEV EVAL SCRMS: CPT

## 2020-02-15 ENCOUNTER — NON-APPOINTMENT (OUTPATIENT)
Age: 75
End: 2020-02-15

## 2020-02-15 NOTE — REVIEW OF SYSTEMS
[Anxiety] : anxiety [Under Stress] : under stress [Palpitations] : palpitations [Negative] : Heme/Lymph [Shortness Of Breath] : no shortness of breath [Lower Ext Edema] : no extremity edema [Chest Pain] : no chest pain [Dyspnea on exertion] : not dyspnea during exertion [Leg Claudication] : no intermittent leg claudication

## 2020-02-15 NOTE — HISTORY OF PRESENT ILLNESS
[FreeTextEntry1] : 75 yo female with paroxysmal atrial fibrillation -> ablation 9/10/19, and Medtronic LINQ loop recorder (implanted 8/23/18 for evaluation of recurrent palpitations and near syncopal episodes). Patient presents today for follow-up. Doing well. She only reports a brief episode of palpitations which occurred on 2/2/20 (Sun) and was evaluated in [facility]. ECG, CXR, and labs were unremarkable. Remote interrogation of her LINQ only revealed sinus rhythm and occasional PACs, but no afib was reported. She denies chest pain, dyspnea, edema, melena, hematochezia, or hematemesis. \par \par PMD: Brii Brink MD

## 2020-02-15 NOTE — ASSESSMENT
[FreeTextEntry1] : 75 yo female with paroxysmal atrial fibrillation, and Medtronic LINQ loop recorder (implanted 8/23/18 for evaluation of recurrent palpitations and near syncopal episodes). Patient with brief episode of palpitation on 2/2/20 and was evaluated in [facility]. CXR, ECG, and labs were unremarkable.\par \par Remote interrogation of LINQ today revealed sinus rhythm and occasional PACs. No atrial fibrillation was noted. \par ECG today demonstrated no significant changes from prior ECGs.\par Lexiscan nuclear stress test on 8/2/18 demonstrated a small area of mild apical inferior ischemia. \par Given absence of exertional complaints, will continue to monitor clinically for now.\par Will continue Pradaxa 150 mg po bid.

## 2020-02-15 NOTE — PHYSICAL EXAM
[General Appearance - Well Developed] : well developed [No Oral Cyanosis] : no oral cyanosis [Normal Conjunctiva] : the conjunctiva exhibited no abnormalities [General Appearance - Well Nourished] : well nourished [General Appearance - In No Acute Distress] : no acute distress [] : no respiratory distress [Respiration, Rhythm And Depth] : normal respiratory rhythm and effort [Abnormal Walk] : normal gait [Bowel Sounds] : normal bowel sounds [Auscultation Breath Sounds / Voice Sounds] : lungs were clear to auscultation bilaterally [Cyanosis, Localized] : no localized cyanosis [Nail Clubbing] : no clubbing of the fingernails [Affect] : the affect was normal [Oriented To Time, Place, And Person] : oriented to person, place, and time [Rhythm Regular] : regular [Mood] : the mood was normal [Normal S1] : normal S1 [Normal S2] : normal S2 [No Murmur] : no murmurs heard [No Pitting Edema] : no pitting edema present [Normal Rate] : normal [S3] : no S3 [FreeTextEntry1] : No JVD present [S4] : no S4 [Right Carotid Bruit] : no bruit heard over the right carotid [Left Carotid Bruit] : no bruit heard over the left carotid

## 2020-02-18 ENCOUNTER — RX RENEWAL (OUTPATIENT)
Age: 75
End: 2020-02-18

## 2020-03-03 ENCOUNTER — RESULT REVIEW (OUTPATIENT)
Age: 75
End: 2020-03-03

## 2020-03-03 ENCOUNTER — APPOINTMENT (OUTPATIENT)
Dept: PULMONOLOGY | Facility: CLINIC | Age: 75
End: 2020-03-03
Payer: MEDICARE

## 2020-03-03 VITALS
SYSTOLIC BLOOD PRESSURE: 126 MMHG | HEART RATE: 84 BPM | WEIGHT: 199 LBS | OXYGEN SATURATION: 95 % | HEIGHT: 67 IN | BODY MASS INDEX: 31.23 KG/M2 | DIASTOLIC BLOOD PRESSURE: 72 MMHG

## 2020-03-03 PROCEDURE — 99213 OFFICE O/P EST LOW 20 MIN: CPT

## 2020-03-03 NOTE — PHYSICAL EXAM
[Normal Oropharynx] : normal oropharynx [No Acute Distress] : no acute distress [Low Lying Soft Palate] : low lying soft palate [No Neck Mass] : no neck mass [Normal Appearance] : normal appearance [Normal Rate/Rhythm] : normal rate/rhythm [No Murmurs] : no murmurs [Normal S1, S2] : normal s1, s2 [No Resp Distress] : no resp distress [Clear to Auscultation Bilaterally] : clear to auscultation bilaterally [No Abnormalities] : no abnormalities [Benign] : benign [Normal Gait] : normal gait [No Clubbing] : no clubbing [No Cyanosis] : no cyanosis [No Edema] : no edema [FROM] : FROM [Normal Color/ Pigmentation] : normal color/ pigmentation [No Focal Deficits] : no focal deficits [Oriented x3] : oriented x3 [Normal Affect] : normal affect [TextBox_2] : obese

## 2020-03-03 NOTE — HISTORY OF PRESENT ILLNESS
[TextBox_4] : Return for followup of her NIR. S/P ablation for afib and her nasal mask is working much better. He denies daytime sleepiness and feels well. She wears her CPAP machine every night all night. She denies waking up in the morning with dry mouth and she sleeps through the night the mask leak has resolved. [Never] : never [TextBox_100] : 9/27/13 [Obstructive Sleep Apnea] : obstructive sleep apnea [TextBox_108] : 48 [TextBox_116] : 58 [TextBox_127] : 2/2/2020 [TextBox_137] : 100 [TextBox_133] : 100 [TextBox_129] : 3/2/2020 [TextBox_141] : 8 [TextBox_143] : 10 [TextBox_3] : 3 [TextBox_147] : 0.2

## 2020-03-03 NOTE — DISCUSSION/SUMMARY
[FreeTextEntry1] : APAP Compliance;  [X]  30 day [] 90 day  Dates:  [2/2-3/2/2020] \par Settings: [8-16] cm \par Mask; F[]  Airfit Touch [] small []Med []  Lg . [X]  Nasal 20 [ ] Pillows\par %compliance:[100]  \par Pressure:  95thile%  [12]cm max [13]cm\par Average use:  [8:10] \par Leak: [49] L/ min,  max [65]L/ min   [] L/ hrs  []min\par AHI = [0.2] \par

## 2020-03-19 ENCOUNTER — RESULT REVIEW (OUTPATIENT)
Age: 75
End: 2020-03-19

## 2020-05-23 ENCOUNTER — TRANSCRIPTION ENCOUNTER (OUTPATIENT)
Age: 75
End: 2020-05-23

## 2020-07-14 ENCOUNTER — APPOINTMENT (OUTPATIENT)
Dept: INTERNAL MEDICINE | Facility: CLINIC | Age: 75
End: 2020-07-14
Payer: MEDICARE

## 2020-07-14 ENCOUNTER — LABORATORY RESULT (OUTPATIENT)
Age: 75
End: 2020-07-14

## 2020-07-14 VITALS
SYSTOLIC BLOOD PRESSURE: 138 MMHG | BODY MASS INDEX: 32.02 KG/M2 | WEIGHT: 204 LBS | HEART RATE: 92 BPM | TEMPERATURE: 98.9 F | HEIGHT: 67 IN | DIASTOLIC BLOOD PRESSURE: 90 MMHG

## 2020-07-14 DIAGNOSIS — M16.11 UNILATERAL PRIMARY OSTEOARTHRITIS, RIGHT HIP: ICD-10-CM

## 2020-07-14 PROCEDURE — 99213 OFFICE O/P EST LOW 20 MIN: CPT | Mod: 25

## 2020-07-14 PROCEDURE — G0438: CPT

## 2020-07-14 PROCEDURE — 36415 COLL VENOUS BLD VENIPUNCTURE: CPT

## 2020-07-14 NOTE — REVIEW OF SYSTEMS
[Suicidal] : not suicidal [Insomnia] : no insomnia [Anxiety] : anxiety [Negative] : Musculoskeletal [de-identified] : social , possible all her life , seeing therapist

## 2020-07-14 NOTE — HEALTH RISK ASSESSMENT
[Good] : ~his/her~  mood as  good [No] : No [Patient reported mammogram was normal] : Patient reported mammogram was normal [Patient reported bone density results were normal] : Patient reported bone density results were normal [Patient reported colonoscopy was normal] : Patient reported colonoscopy was normal [Hepatitis C test offered] : Hepatitis C test offered [Alone] : lives alone [Retired] : retired [Graduate School] : graduate school [Single] : single [Feels Safe at Home] : Feels safe at home [With Patient/Caregiver] : With Patient/Caregiver [Designated Healthcare Proxy] : Designated healthcare proxy [Name: ___] : Health Care Proxy's Name: [unfilled]  [Relationship: ___] : Relationship: [unfilled] [] : No [MammogramDate] : 03/20 [ColonoscopyDate] : 01/20 [BoneDensityDate] : 01/15 [AdvancecareDate] : 07/14/20

## 2020-07-14 NOTE — PHYSICAL EXAM
[Normal] : normal gait, coordination grossly intact, no focal deficits [de-identified] : bilat normal, no lumps [de-identified] : no rashes

## 2020-07-15 LAB
ALBUMIN SERPL ELPH-MCNC: 4.6 G/DL
ALP BLD-CCNC: 77 U/L
ALT SERPL-CCNC: 20 U/L
ANION GAP SERPL CALC-SCNC: 14 MMOL/L
APPEARANCE: CLEAR
AST SERPL-CCNC: 21 U/L
BASOPHILS # BLD AUTO: 0.05 K/UL
BASOPHILS NFR BLD AUTO: 1 %
BILIRUB SERPL-MCNC: 0.4 MG/DL
BILIRUBIN URINE: NEGATIVE
BLOOD URINE: NORMAL
BUN SERPL-MCNC: 17 MG/DL
CALCIUM SERPL-MCNC: 9.6 MG/DL
CHLORIDE SERPL-SCNC: 105 MMOL/L
CHOLEST SERPL-MCNC: 263 MG/DL
CHOLEST/HDLC SERPL: 5 RATIO
CO2 SERPL-SCNC: 24 MMOL/L
COLOR: YELLOW
CREAT SERPL-MCNC: 0.9 MG/DL
EOSINOPHIL # BLD AUTO: 0.11 K/UL
EOSINOPHIL NFR BLD AUTO: 2.3 %
FOLATE SERPL-MCNC: 14.5 NG/ML
GLUCOSE QUALITATIVE U: NEGATIVE
GLUCOSE SERPL-MCNC: 116 MG/DL
HCT VFR BLD CALC: 42.4 %
HDLC SERPL-MCNC: 53 MG/DL
HGB BLD-MCNC: 13.6 G/DL
IMM GRANULOCYTES NFR BLD AUTO: 0.2 %
KETONES URINE: NEGATIVE
LDLC SERPL CALC-MCNC: 176 MG/DL
LEUKOCYTE ESTERASE URINE: ABNORMAL
LYMPHOCYTES # BLD AUTO: 1.22 K/UL
LYMPHOCYTES NFR BLD AUTO: 25.3 %
MAN DIFF?: NORMAL
MCHC RBC-ENTMCNC: 28.2 PG
MCHC RBC-ENTMCNC: 32.1 GM/DL
MCV RBC AUTO: 88 FL
MONOCYTES # BLD AUTO: 0.57 K/UL
MONOCYTES NFR BLD AUTO: 11.8 %
NEUTROPHILS # BLD AUTO: 2.86 K/UL
NEUTROPHILS NFR BLD AUTO: 59.4 %
NITRITE URINE: NEGATIVE
PH URINE: 5.5
PLATELET # BLD AUTO: 202 K/UL
POTASSIUM SERPL-SCNC: 4.3 MMOL/L
PROT SERPL-MCNC: 7.3 G/DL
PROTEIN URINE: NORMAL
RBC # BLD: 4.82 M/UL
RBC # FLD: 14.1 %
SODIUM SERPL-SCNC: 143 MMOL/L
SPECIFIC GRAVITY URINE: 1.02
T4 FREE SERPL-MCNC: 1.1 NG/DL
TRIGL SERPL-MCNC: 167 MG/DL
TSH SERPL-ACNC: 1.37 UIU/ML
UROBILINOGEN URINE: NORMAL
VIT B12 SERPL-MCNC: 1805 PG/ML
WBC # FLD AUTO: 4.82 K/UL

## 2020-07-24 ENCOUNTER — RESULT REVIEW (OUTPATIENT)
Age: 75
End: 2020-07-24

## 2020-07-30 ENCOUNTER — APPOINTMENT (OUTPATIENT)
Dept: FAMILY MEDICINE | Facility: CLINIC | Age: 75
End: 2020-07-30
Payer: MEDICARE

## 2020-07-30 VITALS
WEIGHT: 200 LBS | TEMPERATURE: 97.8 F | BODY MASS INDEX: 31.39 KG/M2 | DIASTOLIC BLOOD PRESSURE: 90 MMHG | SYSTOLIC BLOOD PRESSURE: 130 MMHG | HEIGHT: 67 IN

## 2020-07-30 DIAGNOSIS — T88.7XXA UNSPECIFIED ADVERSE EFFECT OF DRUG OR MEDICAMENT, INITIAL ENCOUNTER: ICD-10-CM

## 2020-07-30 PROCEDURE — 99213 OFFICE O/P EST LOW 20 MIN: CPT

## 2020-07-30 NOTE — REVIEW OF SYSTEMS
[Nausea] : nausea [de-identified] : hx of anxiety. Feels jittery on Celexa. [FreeTextEntry2] : loss of appetite

## 2020-07-30 NOTE — HEALTH RISK ASSESSMENT
[Monthly or less (1 pt)] : Monthly or less (1 point) [1 or 2 (0 pts)] : 1 or 2 (0 points) [Never (0 pts)] : Never (0 points) [No] : In the past 12 months have you used drugs other than those required for medical reasons? No [No falls in past year] : Patient reported no falls in the past year [0] : 2) Feeling down, depressed, or hopeless: Not at all (0) [Yes] : Yes [] : No [Audit-CScore] : 1 [de-identified] : walking [de-identified] : low carb ,low fat [[id number]] : 0

## 2020-07-30 NOTE — HISTORY OF PRESENT ILLNESS
[FreeTextEntry1] : Follow-up on antidepressant.  [de-identified] : Ms. CHEMA BAILEY is a 74 year old female here today for permission to stop Celexa that she started one week ago\par Hx of anxiety for years. Has seen a therapist. Plans to reconnect with her. \par Feels glittery with no appetite on 10 mg of Celexa.

## 2020-08-03 ENCOUNTER — APPOINTMENT (OUTPATIENT)
Dept: HEART AND VASCULAR | Facility: CLINIC | Age: 75
End: 2020-08-03

## 2020-10-15 ENCOUNTER — RX RENEWAL (OUTPATIENT)
Age: 75
End: 2020-10-15

## 2020-12-08 ENCOUNTER — APPOINTMENT (OUTPATIENT)
Dept: CARDIOLOGY | Facility: CLINIC | Age: 75
End: 2020-12-08
Payer: MEDICARE

## 2020-12-08 ENCOUNTER — NON-APPOINTMENT (OUTPATIENT)
Age: 75
End: 2020-12-08

## 2020-12-08 VITALS
TEMPERATURE: 97.6 F | WEIGHT: 208 LBS | HEART RATE: 87 BPM | RESPIRATION RATE: 12 BRPM | SYSTOLIC BLOOD PRESSURE: 122 MMHG | OXYGEN SATURATION: 96 % | BODY MASS INDEX: 32.58 KG/M2 | DIASTOLIC BLOOD PRESSURE: 77 MMHG

## 2020-12-08 DIAGNOSIS — Z45.09 ENCOUNTER FOR ADJUSTMENT AND MANAGEMENT OF OTHER CARDIAC DEVICE: ICD-10-CM

## 2020-12-08 PROCEDURE — 93298 REM INTERROG DEV EVAL SCRMS: CPT

## 2020-12-08 PROCEDURE — 99214 OFFICE O/P EST MOD 30 MIN: CPT

## 2021-05-18 NOTE — H&P ADULT - NSTOBACCOSCREENHP_GEN_A_NCS
No [Alert] : alert [Well Nourished] : well nourished [Healthy Appearance] : healthy appearance [Obese] : obese [No Acute Distress] : no acute distress [No Proptosis] : no proptosis [No Lid Lag] : no lid lag [Thyroid Not Enlarged] : the thyroid was not enlarged [No Thyroid Nodules] : no palpable thyroid nodules [No Accessory Muscle Use] : no accessory muscle use [Clear to Auscultation] : lungs were clear to auscultation bilaterally [Normal S1, S2] : normal S1 and S2 [No Murmurs] : no murmurs [Regular Rhythm] : with a regular rhythm [Not Tender] : non-tender [Not Distended] : not distended [Soft] : abdomen soft [Acanthosis Nigricans] : no acanthosis nigricans [No Tremors] : no tremors [Oriented x3] : oriented to person, place, and time [de-identified] : Back of the neck , lump, (had previous abscesses ) not red , not inflammed now  [de-identified] : + edema, chronic skin changes

## 2021-09-08 ENCOUNTER — RX RENEWAL (OUTPATIENT)
Age: 76
End: 2021-09-08

## 2021-09-30 ENCOUNTER — RX RENEWAL (OUTPATIENT)
Age: 76
End: 2021-09-30

## 2021-09-30 NOTE — REVIEW OF SYSTEMS
No [Negative] : Heme/Lymph [Shortness Of Breath] : no shortness of breath [Dyspnea on exertion] : not dyspnea during exertion [Chest Pain] : no chest pain [Lower Ext Edema] : no extremity edema [Leg Claudication] : no intermittent leg claudication [Palpitations] : no palpitations

## 2021-09-30 NOTE — PHYSICAL EXAM
[General Appearance - Well Developed] : well developed [General Appearance - Well Nourished] : well nourished [General Appearance - In No Acute Distress] : no acute distress [Normal Conjunctiva] : the conjunctiva exhibited no abnormalities [No Oral Cyanosis] : no oral cyanosis [] : no respiratory distress [Respiration, Rhythm And Depth] : normal respiratory rhythm and effort [Auscultation Breath Sounds / Voice Sounds] : lungs were clear to auscultation bilaterally [Abnormal Walk] : normal gait [Nail Clubbing] : no clubbing of the fingernails [Cyanosis, Localized] : no localized cyanosis [Oriented To Time, Place, And Person] : oriented to person, place, and time [Affect] : the affect was normal [Mood] : the mood was normal [Normal Rate] : normal [Rhythm Regular] : regular [Normal S1] : normal S1 [Normal S2] : normal S2 [No Murmur] : no murmurs heard [No Pitting Edema] : no pitting edema present [FreeTextEntry1] : No JVD present [S3] : no S3 [S4] : no S4 [Right Carotid Bruit] : no bruit heard over the right carotid [Left Carotid Bruit] : no bruit heard over the left carotid

## 2021-09-30 NOTE — ASSESSMENT
[FreeTextEntry1] : 76 yo female with paroxysmal atrial fibrillation, and Medtronic LINQ loop recorder (implanted 8/23/18 for evaluation of recurrent palpitations and near syncopal episodes). Patient is clinically stable.\par \par Remote interrogation of LINQ today revealed sinus rhythm and no atrial fibrillation from 10/15/20 - 11/13/20. \par ECG today demonstrated sinus rhythm with no significant changes from prior ECGs.\par Patient reports that she would like to continue her Pradaxa 150 mg po bid during the COVID-19 pandemic.\par Will address continuation of anticoagulation again at her next follow-up in 6 months.\par \par Lexiscan nuclear stress test on 8/2/18 demonstrated a small area of mild apical inferior ischemia. \par Given absence of exertional complaints, will continue to monitor clinically for now.\par

## 2021-09-30 NOTE — HISTORY OF PRESENT ILLNESS
[FreeTextEntry1] : 76 yo female with paroxysmal atrial fibrillation -> ablation 9/10/19, and Medtronic LINQ loop recorder (implanted 8/23/18 for evaluation of recurrent palpitations and near syncopal episodes). Patient presents today for follow-up. Doing well. Patient denies chest pain, dyspnea, palpitations, syncope, edema, melena, hematochezia, or hematemesis.\par \par PMD: Brii Brink MD

## 2021-11-30 ENCOUNTER — NON-APPOINTMENT (OUTPATIENT)
Age: 76
End: 2021-11-30

## 2021-11-30 RX ORDER — DABIGATRAN ETEXILATE MESYLATE 150 MG/1
150 CAPSULE ORAL TWICE DAILY
Qty: 180 | Refills: 3 | Status: DISCONTINUED | COMMUNITY
Start: 2021-09-30 | End: 2021-11-30

## 2021-12-14 ENCOUNTER — NON-APPOINTMENT (OUTPATIENT)
Age: 76
End: 2021-12-14

## 2021-12-14 ENCOUNTER — APPOINTMENT (OUTPATIENT)
Dept: CARDIOLOGY | Facility: CLINIC | Age: 76
End: 2021-12-14
Payer: MEDICARE

## 2021-12-14 VITALS
WEIGHT: 214 LBS | DIASTOLIC BLOOD PRESSURE: 70 MMHG | BODY MASS INDEX: 33.52 KG/M2 | SYSTOLIC BLOOD PRESSURE: 130 MMHG | RESPIRATION RATE: 12 BRPM | HEART RATE: 89 BPM | OXYGEN SATURATION: 97 %

## 2021-12-14 VITALS
WEIGHT: 214 LBS | DIASTOLIC BLOOD PRESSURE: 80 MMHG | SYSTOLIC BLOOD PRESSURE: 150 MMHG | OXYGEN SATURATION: 97 % | BODY MASS INDEX: 33.52 KG/M2 | HEART RATE: 89 BPM

## 2021-12-14 DIAGNOSIS — R94.39 ABNORMAL RESULT OF OTHER CARDIOVASCULAR FUNCTION STUDY: ICD-10-CM

## 2021-12-14 PROCEDURE — 93297 REM INTERROG DEV EVAL ICPMS: CPT

## 2021-12-14 PROCEDURE — 99214 OFFICE O/P EST MOD 30 MIN: CPT

## 2021-12-14 PROCEDURE — 93000 ELECTROCARDIOGRAM COMPLETE: CPT | Mod: 59

## 2021-12-14 NOTE — ASSESSMENT
[FreeTextEntry1] : 77 yo female with paroxysmal atrial fibrillation, and Medtronic LINQ loop recorder (implanted 8/23/18 for evaluation of recurrent palpitations and near syncopal episodes). Patient is clinically stable.\par \par Remote interrogation of LINQ today revealed sinus rhythm and no recurrence of atrial fibrillation from 11/9/21 - 12/8/21. \par ECG today demonstrated sinus rhythm with no significant changes from prior ECGs.\par Patient discontinued her Pradaxa 2 weeks ago. \par Will continue to monitor LINQ remotely.\par \par Lexiscan nuclear stress test on 8/2/18 demonstrated a small area of mild apical inferior ischemia. \par Echo on 9/3/19 reported normal LV size and systolic function, LVEF 68%, mild conc LVH, and mild TR. \par Given absence of exertional complaints, will continue to monitor clinically for now.\par Weight loss and exercise was advised.

## 2021-12-14 NOTE — PHYSICAL EXAM
[Well Developed] : well developed [No Acute Distress] : no acute distress [Obese] : obese [Normal Conjunctiva] : normal conjunctiva [Normal Venous Pressure] : normal venous pressure [No Carotid Bruit] : no carotid bruit [Normal S1, S2] : normal S1, S2 [No Murmur] : no murmur [No Rub] : no rub [No Gallop] : no gallop [Clear Lung Fields] : clear lung fields [Good Air Entry] : good air entry [No Respiratory Distress] : no respiratory distress  [No Edema] : no edema [No Cyanosis] : no cyanosis [No Clubbing] : no clubbing [Moves all extremities] : moves all extremities [No Focal Deficits] : no focal deficits [Normal Speech] : normal speech [Alert and Oriented] : alert and oriented [Normal memory] : normal memory

## 2021-12-14 NOTE — HISTORY OF PRESENT ILLNESS
[FreeTextEntry1] : 74 yo female with paroxysmal atrial fibrillation -> ablation 9/10/19, and Medtronic LINQ loop recorder (implanted 8/23/18 for evaluation of recurrent palpitations and near syncopal episodes). Patient presents today for follow-up. Doing well. Patient denies chest pain, dyspnea, palpitations, syncope, edema, melena, hematochezia, or hematemesis.\par \par PMD: Brii Brink MD

## 2022-04-07 ENCOUNTER — TRANSCRIPTION ENCOUNTER (OUTPATIENT)
Age: 77
End: 2022-04-07

## 2022-06-12 ENCOUNTER — NON-APPOINTMENT (OUTPATIENT)
Age: 77
End: 2022-06-12

## 2022-06-29 ENCOUNTER — NON-APPOINTMENT (OUTPATIENT)
Age: 77
End: 2022-06-29

## 2022-07-14 ENCOUNTER — NON-APPOINTMENT (OUTPATIENT)
Age: 77
End: 2022-07-14

## 2022-07-28 ENCOUNTER — NON-APPOINTMENT (OUTPATIENT)
Age: 77
End: 2022-07-28

## 2022-08-12 ENCOUNTER — APPOINTMENT (OUTPATIENT)
Dept: INTERNAL MEDICINE | Facility: CLINIC | Age: 77
End: 2022-08-12

## 2022-08-12 ENCOUNTER — LABORATORY RESULT (OUTPATIENT)
Age: 77
End: 2022-08-12

## 2022-08-12 VITALS
WEIGHT: 184 LBS | SYSTOLIC BLOOD PRESSURE: 115 MMHG | OXYGEN SATURATION: 97 % | DIASTOLIC BLOOD PRESSURE: 72 MMHG | HEART RATE: 66 BPM | BODY MASS INDEX: 28.82 KG/M2 | TEMPERATURE: 96.9 F

## 2022-08-12 PROCEDURE — 99213 OFFICE O/P EST LOW 20 MIN: CPT

## 2022-08-12 RX ORDER — ZOSTER VACCINE RECOMBINANT, ADJUVANTED 50 MCG/0.5
50 KIT INTRAMUSCULAR
Qty: 2 | Refills: 0 | Status: DISCONTINUED | COMMUNITY
Start: 2020-07-14 | End: 2022-08-12

## 2022-08-12 RX ORDER — CLOBETASOL PROPIONATE 0.5 MG/G
0.05 AEROSOL, FOAM TOPICAL
Qty: 100 | Refills: 0 | Status: DISCONTINUED | COMMUNITY
Start: 2022-06-15 | End: 2022-08-12

## 2022-08-12 RX ORDER — FLUOCINOLONE ACETONIDE 0.11 MG/ML
0.01 OIL TOPICAL
Qty: 118 | Refills: 0 | Status: DISCONTINUED | COMMUNITY
Start: 2022-05-04 | End: 2022-08-12

## 2022-08-12 RX ORDER — CEPHALEXIN 500 MG/1
500 CAPSULE ORAL
Qty: 14 | Refills: 0 | Status: DISCONTINUED | COMMUNITY
Start: 2022-07-29 | End: 2022-08-12

## 2022-08-12 RX ORDER — CITALOPRAM HYDROBROMIDE 10 MG/1
10 TABLET, FILM COATED ORAL DAILY
Qty: 90 | Refills: 0 | Status: DISCONTINUED | COMMUNITY
Start: 2020-07-14 | End: 2022-08-12

## 2022-08-12 RX ORDER — DORZOLAMIDE HYDROCHLORIDE TIMOLOL MALEATE 20; 5 MG/ML; MG/ML
22.3-6.8 SOLUTION/ DROPS OPHTHALMIC
Qty: 10 | Refills: 0 | Status: ACTIVE | COMMUNITY
Start: 2022-04-18

## 2022-08-12 RX ORDER — CEFPODOXIME PROXETIL 200 MG/1
200 TABLET, FILM COATED ORAL
Qty: 14 | Refills: 0 | Status: DISCONTINUED | COMMUNITY
Start: 2022-06-13 | End: 2022-08-12

## 2022-08-12 RX ORDER — CHOLECALCIFEROL (VITAMIN D3) 25 MCG
TABLET ORAL
Refills: 0 | Status: DISCONTINUED | COMMUNITY
End: 2022-08-12

## 2022-08-12 RX ORDER — AMOXICILLIN 500 MG/1
500 CAPSULE ORAL
Qty: 16 | Refills: 0 | Status: DISCONTINUED | COMMUNITY
Start: 2022-05-17 | End: 2022-08-12

## 2022-08-12 RX ORDER — NITROFURANTOIN (MONOHYDRATE/MACROCRYSTALS) 25; 75 MG/1; MG/1
100 CAPSULE ORAL
Qty: 14 | Refills: 0 | Status: DISCONTINUED | COMMUNITY
Start: 2022-06-30 | End: 2022-08-12

## 2022-08-12 NOTE — PHYSICAL EXAM
[No CVA Tenderness] : no CVA  tenderness [Normal] : normal gait, coordination grossly intact, no focal deficits and deep tendon reflexes were 2+ and symmetric

## 2022-08-12 NOTE — HISTORY OF PRESENT ILLNESS
[de-identified] : Josi comes in office after 3 UTI in the last few months , was treated c 3 abx  last Cephalexin on early Aug , via urgent care\par feels \par she is slightly better after last course, but still some lower abd ? discomfort\par Was last seen in the office in 2020

## 2022-08-12 NOTE — REVIEW OF SYSTEMS
[Abdominal Pain] : abdominal pain [Constipation] : no constipation [Dysuria] : no dysuria [Incontinence] : no incontinence [Hematuria] : no hematuria [Frequency] : no frequency [Negative] : Neurological [FreeTextEntry7] : lower abdominal discomfort see HPI

## 2022-08-17 ENCOUNTER — NON-APPOINTMENT (OUTPATIENT)
Age: 77
End: 2022-08-17

## 2022-08-28 LAB
APPEARANCE: CLEAR
BACTERIA UR CULT: ABNORMAL
BILIRUBIN URINE: NEGATIVE
BLOOD URINE: NEGATIVE
COLOR: YELLOW
GLUCOSE QUALITATIVE U: NEGATIVE
KETONES URINE: NEGATIVE
LEUKOCYTE ESTERASE URINE: ABNORMAL
NITRITE URINE: NEGATIVE
PH URINE: 6.5
PROTEIN URINE: NEGATIVE
SPECIFIC GRAVITY URINE: 1.02
UROBILINOGEN URINE: NORMAL

## 2022-09-13 ENCOUNTER — APPOINTMENT (OUTPATIENT)
Dept: INTERNAL MEDICINE | Facility: CLINIC | Age: 77
End: 2022-09-13

## 2022-09-13 VITALS
TEMPERATURE: 97 F | HEIGHT: 67 IN | DIASTOLIC BLOOD PRESSURE: 70 MMHG | OXYGEN SATURATION: 97 % | WEIGHT: 183 LBS | SYSTOLIC BLOOD PRESSURE: 126 MMHG | HEART RATE: 66 BPM | BODY MASS INDEX: 28.72 KG/M2

## 2022-09-13 DIAGNOSIS — F41.9 ANXIETY DISORDER, UNSPECIFIED: ICD-10-CM

## 2022-09-13 DIAGNOSIS — M77.8 OTHER ENTHESOPATHIES, NOT ELSEWHERE CLASSIFIED: ICD-10-CM

## 2022-09-13 DIAGNOSIS — H40.9 UNSPECIFIED GLAUCOMA: ICD-10-CM

## 2022-09-13 DIAGNOSIS — Z23 ENCOUNTER FOR IMMUNIZATION: ICD-10-CM

## 2022-09-13 DIAGNOSIS — M81.0 AGE-RELATED OSTEOPOROSIS W/OUT CURRENT PATHOLOGICAL FRACTURE: ICD-10-CM

## 2022-09-13 DIAGNOSIS — N39.0 URINARY TRACT INFECTION, SITE NOT SPECIFIED: ICD-10-CM

## 2022-09-13 DIAGNOSIS — Z00.00 ENCOUNTER FOR GENERAL ADULT MEDICAL EXAMINATION W/OUT ABNORMAL FINDINGS: ICD-10-CM

## 2022-09-13 PROCEDURE — G0439: CPT

## 2022-09-13 PROCEDURE — G0009: CPT

## 2022-09-13 PROCEDURE — 99214 OFFICE O/P EST MOD 30 MIN: CPT | Mod: 25

## 2022-09-13 PROCEDURE — 90677 PCV20 VACCINE IM: CPT

## 2022-09-13 RX ORDER — LEVOFLOXACIN 750 MG/1
750 TABLET, FILM COATED ORAL DAILY
Qty: 7 | Refills: 0 | Status: DISCONTINUED | COMMUNITY
Start: 2022-08-17 | End: 2022-09-13

## 2022-09-13 NOTE — HEALTH RISK ASSESSMENT
[Very Good] : ~his/her~  mood as very good [Never] : Never [No] : No [0] : 2) Feeling down, depressed, or hopeless: Not at all (0) [PHQ-2 Negative - No further assessment needed] : PHQ-2 Negative - No further assessment needed [Patient reported mammogram was normal] : Patient reported mammogram was normal [Patient reported bone density results were normal] : Patient reported bone density results were normal [Patient reported colonoscopy was abnormal] : Patient reported colonoscopy was abnormal [Hepatitis C test offered] : Hepatitis C test offered [Alone] : lives alone [Retired] : retired [Graduate School] : graduate school [Single] : single [# Of Children ___] : has [unfilled] children [Fully functional (bathing, dressing, toileting, transferring, walking, feeding)] : Fully functional (bathing, dressing, toileting, transferring, walking, feeding) [Fully functional (using the telephone, shopping, preparing meals, housekeeping, doing laundry, using] : Fully functional and needs no help or supervision to perform IADLs (using the telephone, shopping, preparing meals, housekeeping, doing laundry, using transportation, managing medications and managing finances) [[id number]] : 0 [Reports changes in hearing] : Reports no changes in hearing [Reports changes in vision] : Reports no changes in vision [MammogramDate] : 01/19 [BoneDensityDate] : 01/19 [ColonoscopyDate] : 01/18 [ColonoscopyComments] : 3 yr f/u  polyps

## 2022-09-13 NOTE — REVIEW OF SYSTEMS
[Abdominal Pain] : abdominal pain [Vision Problems] : vision problems [Skin Rash] : skin rash [Negative] : Heme/Lymph [Discharge] : no discharge [Pain] : no pain [Constipation] : no constipation [Dysuria] : no dysuria [Incontinence] : no incontinence [Hematuria] : no hematuria [Frequency] : no frequency [de-identified] : rosacea

## 2022-09-13 NOTE — PHYSICAL EXAM
[Normal] : normal gait, coordination grossly intact, no focal deficits [de-identified] : bilat normal, no  gross lumps

## 2022-09-14 LAB
ALBUMIN SERPL ELPH-MCNC: 4.8 G/DL
ALP BLD-CCNC: 96 U/L
ALT SERPL-CCNC: 19 U/L
ANION GAP SERPL CALC-SCNC: 14 MMOL/L
APPEARANCE: CLEAR
AST SERPL-CCNC: 19 U/L
BASOPHILS # BLD AUTO: 0.04 K/UL
BASOPHILS NFR BLD AUTO: 0.7 %
BILIRUB SERPL-MCNC: 0.5 MG/DL
BILIRUBIN URINE: NEGATIVE
BLOOD URINE: NEGATIVE
BUN SERPL-MCNC: 13 MG/DL
CALCIUM SERPL-MCNC: 9.8 MG/DL
CHLORIDE SERPL-SCNC: 106 MMOL/L
CHOLEST SERPL-MCNC: 259 MG/DL
CO2 SERPL-SCNC: 24 MMOL/L
COLOR: YELLOW
CREAT SERPL-MCNC: 0.84 MG/DL
EGFR: 73 ML/MIN/1.73M2
EOSINOPHIL # BLD AUTO: 0.04 K/UL
EOSINOPHIL NFR BLD AUTO: 0.7 %
FOLATE SERPL-MCNC: 6.1 NG/ML
GLUCOSE QUALITATIVE U: NEGATIVE
GLUCOSE SERPL-MCNC: 103 MG/DL
HCT VFR BLD CALC: 44.1 %
HCV AB SER QL: NONREACTIVE
HCV S/CO RATIO: 0.13 S/CO
HDLC SERPL-MCNC: 56 MG/DL
HGB BLD-MCNC: 13.8 G/DL
IMM GRANULOCYTES NFR BLD AUTO: 0.3 %
KETONES URINE: NEGATIVE
LDLC SERPL CALC-MCNC: 179 MG/DL
LEUKOCYTE ESTERASE URINE: NEGATIVE
LYMPHOCYTES # BLD AUTO: 1.67 K/UL
LYMPHOCYTES NFR BLD AUTO: 27.6 %
MAN DIFF?: NORMAL
MCHC RBC-ENTMCNC: 28.4 PG
MCHC RBC-ENTMCNC: 31.3 GM/DL
MCV RBC AUTO: 90.7 FL
MONOCYTES # BLD AUTO: 0.58 K/UL
MONOCYTES NFR BLD AUTO: 9.6 %
NEUTROPHILS # BLD AUTO: 3.69 K/UL
NEUTROPHILS NFR BLD AUTO: 61.1 %
NITRITE URINE: NEGATIVE
NONHDLC SERPL-MCNC: 203 MG/DL
PH URINE: 6.5
PLATELET # BLD AUTO: 236 K/UL
POTASSIUM SERPL-SCNC: 4.4 MMOL/L
PROT SERPL-MCNC: 7.2 G/DL
PROTEIN URINE: NEGATIVE
RBC # BLD: 4.86 M/UL
RBC # FLD: 13.9 %
SODIUM SERPL-SCNC: 143 MMOL/L
SPECIFIC GRAVITY URINE: 1.02
T4 FREE SERPL-MCNC: 1.1 NG/DL
TRIGL SERPL-MCNC: 118 MG/DL
TSH SERPL-ACNC: 1.53 UIU/ML
UROBILINOGEN URINE: NORMAL
VIT B12 SERPL-MCNC: >2000 PG/ML
WBC # FLD AUTO: 6.04 K/UL

## 2022-09-18 LAB
ESTIMATED AVERAGE GLUCOSE: 117 MG/DL
HBA1C MFR BLD HPLC: 5.7 %

## 2022-10-03 ENCOUNTER — NON-APPOINTMENT (OUTPATIENT)
Age: 77
End: 2022-10-03

## 2022-11-15 ENCOUNTER — APPOINTMENT (OUTPATIENT)
Dept: CARDIOLOGY | Facility: CLINIC | Age: 77
End: 2022-11-15

## 2022-11-15 PROCEDURE — 93306 TTE W/DOPPLER COMPLETE: CPT

## 2022-11-17 ENCOUNTER — RESULT REVIEW (OUTPATIENT)
Age: 77
End: 2022-11-17

## 2022-11-17 ENCOUNTER — NON-APPOINTMENT (OUTPATIENT)
Age: 77
End: 2022-11-17

## 2022-12-01 ENCOUNTER — RESULT REVIEW (OUTPATIENT)
Age: 77
End: 2022-12-01

## 2023-01-18 ENCOUNTER — NON-APPOINTMENT (OUTPATIENT)
Age: 78
End: 2023-01-18

## 2023-01-18 ENCOUNTER — APPOINTMENT (OUTPATIENT)
Dept: CARDIOLOGY | Facility: CLINIC | Age: 78
End: 2023-01-18
Payer: MEDICARE

## 2023-01-18 VITALS
HEIGHT: 67 IN | BODY MASS INDEX: 30.61 KG/M2 | DIASTOLIC BLOOD PRESSURE: 60 MMHG | OXYGEN SATURATION: 97 % | HEART RATE: 79 BPM | SYSTOLIC BLOOD PRESSURE: 139 MMHG | WEIGHT: 195 LBS

## 2023-01-18 PROCEDURE — 93000 ELECTROCARDIOGRAM COMPLETE: CPT

## 2023-01-18 PROCEDURE — 99214 OFFICE O/P EST MOD 30 MIN: CPT

## 2023-01-18 NOTE — REASON FOR VISIT
[Hyperlipidemia] : hyperlipidemia [Coronary Artery Disease] : coronary artery disease [Other: ____] : [unfilled]

## 2023-01-18 NOTE — CARDIOLOGY SUMMARY
[de-identified] : \par 1/18/23 ECG: Sinus rhythm, rate 62 bpm, RSR' pattern in V1, cannot rule out inferior infarct, cannot rule out anterolateral infarct\par 12/14/21 ECG: Sinus rhythm, rate 86 bpm, RSR' pattern in V1, cannot rule out inferior infarct, cannot rule out anterolateral infarct. No significant change from 12/8/20 ECG.\par 12/8/20 ECG: Sinus rhythm, rate 87 bpm, RSR' pattern in V1, cannot rule out inferolateral infarct. No change from 2/14/20 ECG. \par  [de-identified] :   \par 8/2/18 Lexiscan Myoview (at [facility]): Epigastric discomfort but no ECG changes. Small area of mild apical inferior ischemia. Remainder of LV demonstrated normal myocardial perfusion. Hyperdynamic LV wall motion with LVEF > 75%.\par  [de-identified] : \par 11/15/22 Echo: Normal LV size and systolic function, LVEF 62%. Mild conc LVH. Normal RV size and systolic function. Mild AV sclerosis. Mild MR.\par \par 9/3/19 Echo: Normal LV size and systolic function, LVEF 68%. Mild conc LVH. Mild AV sclerosis. Mild TR. \par \par 12/12/17 Echo: Technically limited study. Normal LV systolic function, EF 58%. Mild LV diastolic dysfunction. Mild LVH. Thickened MV leaflets. Mild MR. AV sclerosis. Mild TR. PASP 26 mmHg.\par \par 12/29/15 Echo: Normal LV size and wall motion. Mild diastolic dysfunction. Min conc LVH. MV leaflets are mildly thickened. Mild MR/TR/AZ. Minimal pericardial effusion. \par  [de-identified] : \par 12/1/22 Coronary calcium score:\par Total Agatston score = 269 (, RCA 10)\par Ectatic ascending aorta (3.9 cm)

## 2023-01-18 NOTE — ASSESSMENT
[FreeTextEntry1] : 78 yo female with paroxysmal atrial fibrillation -> ablation 9/10/19, CAD (coronary artery calcification noted on 12/1/22 coronary calcium score), Medtronic LINQ loop recorder (implanted 8/23/18 for evaluation of recurrent palpitations and near syncopal episodes, but now depleted battery). \par \par ECG today demonstrated sinus rhythm with no significant changes from prior ECGs.\par No recurrence of atrial fibrillation since her ablation in 2019.\par Pradaxa was discontinued in early Dec 2021.\par \par Patient with CAD (LAD coronary artery calcification) noted on 12/1/22 coronary calcium score.\par Patient currently without exertional complaints. \par Will continue rosuvastatin 10 mg po daily. Patient to return in mid Feb 2023 for repeat labs (CMP, lipid panel) and will call with results and adjust statin dosage as needed.

## 2023-01-18 NOTE — HISTORY OF PRESENT ILLNESS
[FreeTextEntry1] : 76 yo female with paroxysmal atrial fibrillation -> ablation 9/10/19, CAD (coronary artery calcification noted on 12/1/22 coronary calcium score), [employer] LINQ loop recorder (implanted 8/23/18 for evaluation of recurrent palpitations and near syncopal episodes, but now depleted battery). Patient presents today for follow-up. Doing well. Patient denies chest pain, dyspnea, palpitations, syncope, edema, melena, hematochezia, or hematemesis. She reports that she just started rosuvastatin in early Jan 2023. She denies myalgias.\par \par PMD: Brii Brink MD

## 2023-02-17 ENCOUNTER — APPOINTMENT (OUTPATIENT)
Dept: CARDIOLOGY | Facility: CLINIC | Age: 78
End: 2023-02-17

## 2023-02-21 LAB
ALBUMIN SERPL ELPH-MCNC: 4.7 G/DL
ALP BLD-CCNC: 92 U/L
ALT SERPL-CCNC: 20 U/L
ANION GAP SERPL CALC-SCNC: 12 MMOL/L
AST SERPL-CCNC: 20 U/L
BILIRUB SERPL-MCNC: 0.4 MG/DL
BUN SERPL-MCNC: 18 MG/DL
CALCIUM SERPL-MCNC: 9.6 MG/DL
CHLORIDE SERPL-SCNC: 106 MMOL/L
CHOLEST SERPL-MCNC: 150 MG/DL
CO2 SERPL-SCNC: 22 MMOL/L
CREAT SERPL-MCNC: 0.78 MG/DL
EGFR: 79 ML/MIN/1.73M2
GLUCOSE SERPL-MCNC: 109 MG/DL
HDLC SERPL-MCNC: 63 MG/DL
LDLC SERPL CALC-MCNC: 69 MG/DL
NONHDLC SERPL-MCNC: 87 MG/DL
POTASSIUM SERPL-SCNC: 4.3 MMOL/L
PROT SERPL-MCNC: 7.2 G/DL
SODIUM SERPL-SCNC: 141 MMOL/L
TRIGL SERPL-MCNC: 92 MG/DL

## 2023-03-02 ENCOUNTER — RX RENEWAL (OUTPATIENT)
Age: 78
End: 2023-03-02

## 2023-04-04 ENCOUNTER — NON-APPOINTMENT (OUTPATIENT)
Age: 78
End: 2023-04-04

## 2023-04-10 ENCOUNTER — NON-APPOINTMENT (OUTPATIENT)
Age: 78
End: 2023-04-10

## 2023-06-23 RX ORDER — CLOBETASOL PROPIONATE 0.5 MG/G
0.05 CREAM TOPICAL
Qty: 60 | Refills: 0 | Status: ACTIVE | COMMUNITY
Start: 2020-02-18 | End: 1900-01-01

## 2023-08-03 ENCOUNTER — APPOINTMENT (OUTPATIENT)
Dept: CARDIOLOGY | Facility: CLINIC | Age: 78
End: 2023-08-03
Payer: MEDICARE

## 2023-08-03 VITALS
HEART RATE: 60 BPM | DIASTOLIC BLOOD PRESSURE: 68 MMHG | RESPIRATION RATE: 14 BRPM | HEIGHT: 67 IN | BODY MASS INDEX: 29.03 KG/M2 | WEIGHT: 185 LBS | SYSTOLIC BLOOD PRESSURE: 137 MMHG | OXYGEN SATURATION: 98 %

## 2023-08-03 DIAGNOSIS — R73.03 PREDIABETES.: ICD-10-CM

## 2023-08-03 PROCEDURE — 36415 COLL VENOUS BLD VENIPUNCTURE: CPT

## 2023-08-03 PROCEDURE — 99214 OFFICE O/P EST MOD 30 MIN: CPT

## 2023-08-03 PROCEDURE — 93000 ELECTROCARDIOGRAM COMPLETE: CPT

## 2023-08-03 NOTE — HISTORY OF PRESENT ILLNESS
[FreeTextEntry1] : 78 yo female with paroxysmal atrial fibrillation -> ablation 9/10/19, CAD (coronary artery calcification noted on 12/1/22 coronary calcium score), [employer] LINQ loop recorder (implanted 8/23/18 for evaluation of recurrent palpitations and near syncopal episodes, but now depleted battery), and hyperlipidemia. Patient presents today for follow-up. Doing well. Patient denies chest pain, dyspnea, syncope, edema, melena, hematochezia, or hematemesis. She only reports rare very brief episodes of palpitations which only last for a few seconds before resolving.   PMD: Brii Brink MD

## 2023-08-03 NOTE — ASSESSMENT
[FreeTextEntry1] : 78 yo female with paroxysmal atrial fibrillation -> ablation 9/10/19, CAD (coronary artery calcification noted on 12/1/22 coronary calcium score), [employer] LINQ loop recorder (implanted 8/23/18 for evaluation of recurrent palpitations and near syncopal episodes, but now depleted battery), and hyperlipidemia.  ECG today demonstrated sinus bradycardia, rate 55 bpm, RSR' pattern in V1, low voltage QRS, cannot rule out inferior infarct, cannot rule out anterolateral infarct.  No recurrence of atrial fibrillation since her ablation in 2019. Pradaxa was discontinued in early Dec 2021.  Patient with CAD (LAD coronary artery calcification) noted on 12/1/22 coronary calcium score. Patient currently without exertional complaints.  Will continue rosuvastatin 10 mg po daily. Patient to return for labs (CBC, CMP, lipid panel).  Given prediabetes per 9/2022 HgbA1c, will repeat HgbA1c when patient returns for labs.

## 2023-08-03 NOTE — CARDIOLOGY SUMMARY
[de-identified] : 8/3/23 ECG: Sinus bradycardia, rate 55 bpm, RSR' pattern in V1, low voltage QRS, cannot rule out inferior infarct, cannot rule out anterolateral infarct 1/18/23 ECG: Sinus rhythm, rate 62 bpm, RSR' pattern in V1, cannot rule out inferior infarct, cannot rule out anterolateral infarct 12/14/21 ECG: Sinus rhythm, rate 86 bpm, RSR' pattern in V1, cannot rule out inferior infarct, cannot rule out anterolateral infarct. [de-identified] : 8/2/18 Lexiscan Myoview (at [facility]): Epigastric discomfort but no ECG changes. Small area of mild apical inferior ischemia. Remainder of LV demonstrated normal myocardial perfusion. Hyperdynamic LV wall motion with LVEF > 75%. [de-identified] : 11/15/22 Echo: Normal LV size and systolic function, LVEF 62%. Mild conc LVH. Normal RV size and systolic function. Mild AV sclerosis. Mild MR. [de-identified] : \par  12/1/22 Coronary calcium score:\par  Total Agatston score = 269 (, RCA 10)\par  Ectatic ascending aorta (3.9 cm)

## 2023-08-03 NOTE — PHYSICAL EXAM
[Well Developed] : well developed [No Acute Distress] : no acute distress [Obese] : obese [Normal Conjunctiva] : normal conjunctiva [Normal Venous Pressure] : normal venous pressure [No Carotid Bruit] : no carotid bruit [Normal S1, S2] : normal S1, S2 [No Murmur] : no murmur [No Rub] : no rub [No Gallop] : no gallop [Clear Lung Fields] : clear lung fields [Good Air Entry] : good air entry [No Respiratory Distress] : no respiratory distress  [No Edema] : no edema [No Cyanosis] : no cyanosis [No Clubbing] : no clubbing [Moves all extremities] : moves all extremities [No Focal Deficits] : no focal deficits [Normal Speech] : normal speech [Alert and Oriented] : alert and oriented

## 2023-08-04 ENCOUNTER — APPOINTMENT (OUTPATIENT)
Dept: CARDIOLOGY | Facility: CLINIC | Age: 78
End: 2023-08-04
Payer: MEDICARE

## 2023-08-04 ENCOUNTER — NON-APPOINTMENT (OUTPATIENT)
Age: 78
End: 2023-08-04

## 2023-08-04 PROCEDURE — 36415 COLL VENOUS BLD VENIPUNCTURE: CPT

## 2023-08-07 LAB
ALBUMIN SERPL ELPH-MCNC: 4.8 G/DL
ALP BLD-CCNC: 85 U/L
ALT SERPL-CCNC: 16 U/L
ANION GAP SERPL CALC-SCNC: 13 MMOL/L
AST SERPL-CCNC: 22 U/L
BILIRUB SERPL-MCNC: 0.4 MG/DL
BUN SERPL-MCNC: 17 MG/DL
CALCIUM SERPL-MCNC: 9.8 MG/DL
CHLORIDE SERPL-SCNC: 108 MMOL/L
CHOLEST SERPL-MCNC: 142 MG/DL
CO2 SERPL-SCNC: 22 MMOL/L
CREAT SERPL-MCNC: 0.77 MG/DL
EGFR: 80 ML/MIN/1.73M2
ESTIMATED AVERAGE GLUCOSE: 111 MG/DL
GLUCOSE SERPL-MCNC: 106 MG/DL
HBA1C MFR BLD HPLC: 5.5 %
HCT VFR BLD CALC: 42.8 %
HDLC SERPL-MCNC: 60 MG/DL
HGB BLD-MCNC: 13.6 G/DL
LDLC SERPL CALC-MCNC: 63 MG/DL
MCHC RBC-ENTMCNC: 28.7 PG
MCHC RBC-ENTMCNC: 31.8 GM/DL
MCV RBC AUTO: 90.3 FL
NONHDLC SERPL-MCNC: 81 MG/DL
PLATELET # BLD AUTO: 200 K/UL
POTASSIUM SERPL-SCNC: 4.5 MMOL/L
PROT SERPL-MCNC: 7.1 G/DL
RBC # BLD: 4.74 M/UL
RBC # FLD: 13.3 %
SODIUM SERPL-SCNC: 142 MMOL/L
TRIGL SERPL-MCNC: 100 MG/DL
WBC # FLD AUTO: 5.57 K/UL

## 2023-09-07 ENCOUNTER — APPOINTMENT (OUTPATIENT)
Dept: DERMATOLOGY | Facility: CLINIC | Age: 78
End: 2023-09-07
Payer: MEDICARE

## 2023-09-07 VITALS — HEIGHT: 67 IN | BODY MASS INDEX: 28.25 KG/M2 | WEIGHT: 180 LBS

## 2023-09-07 DIAGNOSIS — R21 RASH AND OTHER NONSPECIFIC SKIN ERUPTION: ICD-10-CM

## 2023-09-07 DIAGNOSIS — L71.9 ROSACEA, UNSPECIFIED: ICD-10-CM

## 2023-09-07 DIAGNOSIS — L66.9 CICATRICIAL ALOPECIA, UNSPECIFIED: ICD-10-CM

## 2023-09-07 PROCEDURE — 11105 PUNCH BX SKIN EA SEP/ADDL: CPT

## 2023-09-07 PROCEDURE — 99204 OFFICE O/P NEW MOD 45 MIN: CPT | Mod: 25

## 2023-09-07 PROCEDURE — 11104 PUNCH BX SKIN SINGLE LESION: CPT

## 2023-09-07 NOTE — HISTORY OF PRESENT ILLNESS
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

## 2023-09-07 NOTE — PHYSICAL EXAM
[Alert] : alert [Oriented x 3] : ~L oriented x 3 [FreeTextEntry3] : Focused exam performed. Findings notable for:  Frontal scalp with scarring alopecia and perifollicular erythema; lonely hairs Loss of most of eyebrows Eroded pink papules on sun exposed arm Centrofacial erythema/telangiectasias

## 2023-09-11 PROBLEM — R21 RASH: Status: ACTIVE | Noted: 2023-09-11

## 2023-09-11 PROBLEM — L66.9 CICATRICIAL ALOPECIA, UNSPECIFIED: Status: ACTIVE | Noted: 2023-09-11

## 2023-09-11 PROBLEM — L71.9 ROSACEA: Status: ACTIVE | Noted: 2022-09-13

## 2023-09-11 RX ORDER — METRONIDAZOLE 7.5 MG/G
0.75 GEL TOPICAL TWICE DAILY
Qty: 1 | Refills: 2 | Status: ACTIVE | COMMUNITY
Start: 2022-04-06 | End: 1900-01-01

## 2023-09-18 ENCOUNTER — RX RENEWAL (OUTPATIENT)
Age: 78
End: 2023-09-18

## 2023-09-25 ENCOUNTER — RX RENEWAL (OUTPATIENT)
Age: 78
End: 2023-09-25

## 2023-09-27 ENCOUNTER — APPOINTMENT (OUTPATIENT)
Dept: DERMATOLOGY | Facility: CLINIC | Age: 78
End: 2023-09-27
Payer: MEDICARE

## 2023-09-27 DIAGNOSIS — L20.9 ATOPIC DERMATITIS, UNSPECIFIED: ICD-10-CM

## 2023-09-27 DIAGNOSIS — L66.1 LICHEN PLANOPILARIS: ICD-10-CM

## 2023-09-27 PROCEDURE — 99214 OFFICE O/P EST MOD 30 MIN: CPT

## 2023-09-27 RX ORDER — RUXOLITINIB 15 MG/G
1.5 CREAM TOPICAL
Qty: 1 | Refills: 2 | Status: ACTIVE | COMMUNITY
Start: 2023-09-27 | End: 1900-01-01

## 2023-09-27 RX ORDER — BIMATOPROST 0.3 MG/ML
0.03 SOLUTION/ DROPS OPHTHALMIC
Qty: 1 | Refills: 5 | Status: ACTIVE | COMMUNITY
Start: 2023-09-27 | End: 1900-01-01

## 2023-09-27 RX ORDER — DOXYCYCLINE 100 MG/1
100 CAPSULE ORAL
Qty: 180 | Refills: 0 | Status: ACTIVE | COMMUNITY
Start: 2023-09-27 | End: 1900-01-01

## 2023-09-28 RX ORDER — TACROLIMUS 1 MG/G
0.1 OINTMENT TOPICAL
Qty: 1 | Refills: 1 | Status: ACTIVE | COMMUNITY
Start: 2023-09-28 | End: 1900-01-01

## 2024-03-19 ENCOUNTER — APPOINTMENT (OUTPATIENT)
Dept: CARDIOLOGY | Facility: CLINIC | Age: 79
End: 2024-03-19
Payer: MEDICARE

## 2024-03-19 PROCEDURE — 36415 COLL VENOUS BLD VENIPUNCTURE: CPT

## 2024-03-20 ENCOUNTER — NON-APPOINTMENT (OUTPATIENT)
Age: 79
End: 2024-03-20

## 2024-03-20 ENCOUNTER — APPOINTMENT (OUTPATIENT)
Dept: CARDIOLOGY | Facility: CLINIC | Age: 79
End: 2024-03-20
Payer: MEDICARE

## 2024-03-20 VITALS
BODY MASS INDEX: 29.82 KG/M2 | HEART RATE: 68 BPM | WEIGHT: 190 LBS | DIASTOLIC BLOOD PRESSURE: 70 MMHG | SYSTOLIC BLOOD PRESSURE: 128 MMHG | HEIGHT: 67 IN | RESPIRATION RATE: 14 BRPM | OXYGEN SATURATION: 97 %

## 2024-03-20 DIAGNOSIS — E78.2 MIXED HYPERLIPIDEMIA: ICD-10-CM

## 2024-03-20 LAB
ALBUMIN SERPL ELPH-MCNC: 4.6 G/DL
ALP BLD-CCNC: 76 U/L
ALT SERPL-CCNC: 16 U/L
ANION GAP SERPL CALC-SCNC: 14 MMOL/L
AST SERPL-CCNC: 19 U/L
BILIRUB SERPL-MCNC: 0.5 MG/DL
BUN SERPL-MCNC: 23 MG/DL
CALCIUM SERPL-MCNC: 9.6 MG/DL
CHLORIDE SERPL-SCNC: 105 MMOL/L
CHOLEST SERPL-MCNC: 155 MG/DL
CO2 SERPL-SCNC: 25 MMOL/L
CREAT SERPL-MCNC: 0.88 MG/DL
EGFR: 68 ML/MIN/1.73M2
ESTIMATED AVERAGE GLUCOSE: 114 MG/DL
GLUCOSE SERPL-MCNC: 94 MG/DL
HBA1C MFR BLD HPLC: 5.6 %
HCT VFR BLD CALC: 40.4 %
HDLC SERPL-MCNC: 63 MG/DL
HGB BLD-MCNC: 13.3 G/DL
LDLC SERPL CALC-MCNC: 72 MG/DL
MCHC RBC-ENTMCNC: 29 PG
MCHC RBC-ENTMCNC: 32.9 GM/DL
MCV RBC AUTO: 88 FL
NONHDLC SERPL-MCNC: 92 MG/DL
PLATELET # BLD AUTO: 194 K/UL
POTASSIUM SERPL-SCNC: 4.5 MMOL/L
PROT SERPL-MCNC: 6.9 G/DL
RBC # BLD: 4.59 M/UL
RBC # FLD: 13.4 %
SODIUM SERPL-SCNC: 145 MMOL/L
TRIGL SERPL-MCNC: 113 MG/DL
WBC # FLD AUTO: 4.62 K/UL

## 2024-03-20 PROCEDURE — 93000 ELECTROCARDIOGRAM COMPLETE: CPT

## 2024-03-20 PROCEDURE — 99214 OFFICE O/P EST MOD 30 MIN: CPT

## 2024-03-20 RX ORDER — ROSUVASTATIN CALCIUM 10 MG/1
10 TABLET, FILM COATED ORAL
Qty: 90 | Refills: 3 | Status: ACTIVE | COMMUNITY
Start: 2022-12-02 | End: 1900-01-01

## 2024-03-20 NOTE — HISTORY OF PRESENT ILLNESS
[FreeTextEntry1] : 77 yo female with paroxysmal atrial fibrillation -> ablation 9/10/19, CAD (coronary artery calcification noted on 12/1/22 coronary calcium score), [employer] LINQ loop recorder (implanted 8/23/18 for evaluation of recurrent palpitations and near syncopal episodes, but now depleted battery), and hyperlipidemia.   Patient presents today for follow-up. Doing well. Patient denies chest pain, dyspnea, syncope, edema, melena, hematochezia, or hematemesis. She reports only very brief palpitations lasting seconds before resolving.  PMD: Brii Brink MD

## 2024-03-20 NOTE — CARDIOLOGY SUMMARY
[de-identified] : 3/20/24 ECG: Sinus rhythm, rate 61 bpm, RSR' pattern in V1, cannot rule out inferolateral infarct 8/3/23 ECG: Sinus bradycardia, rate 55 bpm, RSR' pattern in V1, low voltage QRS, cannot rule out inferior infarct, cannot rule out anterolateral infarct 1/18/23 ECG: Sinus rhythm, rate 62 bpm, RSR' pattern in V1, cannot rule out inferior infarct, cannot rule out anterolateral infarct [de-identified] : 12/1/22 Coronary calcium score: Total Agatston score = 269 (, RCA 10). Ectatic ascending aorta (3.9 cm). [de-identified] : 8/2/18 Lexiscan Myoview (at [facility]): Epigastric discomfort but no ECG changes. Small area of mild apical inferior ischemia. Remainder of LV demonstrated normal myocardial perfusion. Hyperdynamic LV wall motion with LVEF > 75%. [de-identified] : 11/15/22 Echo: Normal LV size and systolic function, LVEF 62%. Mild conc LVH. Normal RV size and systolic function. Mild AV sclerosis. Mild MR.

## 2024-03-20 NOTE — ASSESSMENT
[FreeTextEntry1] : 77 yo female with paroxysmal atrial fibrillation -> ablation 9/10/19, CAD (coronary artery calcification noted on 12/1/22 coronary calcium score), [employer] LINQ loop recorder (implanted 8/23/18 for evaluation of recurrent palpitations and near syncopal episodes, but now depleted battery), and hyperlipidemia.  ECG today demonstrated sinus rhythm, rate 61 bpm, RSR' pattern in V1, cannot rule out inferolateral infarct. No recurrence of atrial fibrillation since her ablation in 2019. Pradaxa was discontinued in early Dec 2021. Will continue to monitor at this time.  Patient with CAD (LAD coronary artery calcification) noted on 12/1/22 coronary calcium score. Patient currently without exertional complaints.  LDL 72 per 3/19/24 labs. Will continue rosuvastatin 10 mg po daily.

## 2024-04-08 ENCOUNTER — NON-APPOINTMENT (OUTPATIENT)
Age: 79
End: 2024-04-08

## 2024-05-08 ENCOUNTER — APPOINTMENT (OUTPATIENT)
Dept: GASTROENTEROLOGY | Facility: CLINIC | Age: 79
End: 2024-05-08
Payer: MEDICARE

## 2024-05-08 VITALS
HEIGHT: 67 IN | SYSTOLIC BLOOD PRESSURE: 130 MMHG | WEIGHT: 190 LBS | BODY MASS INDEX: 29.82 KG/M2 | DIASTOLIC BLOOD PRESSURE: 80 MMHG

## 2024-05-08 DIAGNOSIS — Z12.11 ENCOUNTER FOR SCREENING FOR MALIGNANT NEOPLASM OF COLON: ICD-10-CM

## 2024-05-08 DIAGNOSIS — K59.09 OTHER CONSTIPATION: ICD-10-CM

## 2024-05-08 PROCEDURE — G2211 COMPLEX E/M VISIT ADD ON: CPT

## 2024-05-08 PROCEDURE — 99204 OFFICE O/P NEW MOD 45 MIN: CPT

## 2024-05-08 NOTE — PHYSICAL EXAM
[Alert] : alert [Sclera] : the sclera and conjunctiva were normal [No Respiratory Distress] : no respiratory distress [None] : no edema [No Masses] : no abdominal mass palpated [No CVA Tenderness] : no CVA  tenderness [Abnormal Walk] : normal gait [Normal Color / Pigmentation] : normal skin color and pigmentation [No Focal Deficits] : no focal deficits [Oriented To Time, Place, And Person] : oriented to person, place, and time [de-identified] : Deferred pending colonoscopy

## 2024-05-08 NOTE — HISTORY OF PRESENT ILLNESS
[FreeTextEntry1] : CHEMA BAILEY  is being evaluated at the request of Dr. Brink  for an opinion re: colon cancer screening. Admits to constipation characterized as pellet -lke stools every other day despite water / fiber.  Denies nausea , vomiting, fever, chills , diarrhea, melena, hematemesis, BRBPR  - Colonoscopy 1/2020: hemorrhoids / diverticulosis / polyp

## 2024-05-23 ENCOUNTER — APPOINTMENT (OUTPATIENT)
Dept: INTERNAL MEDICINE | Facility: CLINIC | Age: 79
End: 2024-05-23
Payer: MEDICARE

## 2024-05-23 VITALS
BODY MASS INDEX: 30.29 KG/M2 | SYSTOLIC BLOOD PRESSURE: 140 MMHG | WEIGHT: 193 LBS | RESPIRATION RATE: 16 BRPM | HEART RATE: 60 BPM | DIASTOLIC BLOOD PRESSURE: 74 MMHG | TEMPERATURE: 97.5 F | OXYGEN SATURATION: 98 % | HEIGHT: 67.05 IN

## 2024-05-23 VITALS — SYSTOLIC BLOOD PRESSURE: 110 MMHG | DIASTOLIC BLOOD PRESSURE: 70 MMHG

## 2024-05-23 DIAGNOSIS — G47.33 OBSTRUCTIVE SLEEP APNEA (ADULT) (PEDIATRIC): ICD-10-CM

## 2024-05-23 DIAGNOSIS — Z00.00 ENCOUNTER FOR GENERAL ADULT MEDICAL EXAMINATION W/OUT ABNORMAL FINDINGS: ICD-10-CM

## 2024-05-23 DIAGNOSIS — Z23 ENCOUNTER FOR IMMUNIZATION: ICD-10-CM

## 2024-05-23 DIAGNOSIS — Z12.39 ENCOUNTER FOR OTHER SCREENING FOR MALIGNANT NEOPLASM OF BREAST: ICD-10-CM

## 2024-05-23 DIAGNOSIS — I25.10 ATHEROSCLEROTIC HEART DISEASE OF NATIVE CORONARY ARTERY W/OUT ANGINA PECTORIS: ICD-10-CM

## 2024-05-23 DIAGNOSIS — I48.0 PAROXYSMAL ATRIAL FIBRILLATION: ICD-10-CM

## 2024-05-23 PROCEDURE — 90471 IMMUNIZATION ADMIN: CPT

## 2024-05-23 PROCEDURE — G0444 DEPRESSION SCREEN ANNUAL: CPT | Mod: 59

## 2024-05-23 PROCEDURE — 90715 TDAP VACCINE 7 YRS/> IM: CPT | Mod: GY

## 2024-05-23 PROCEDURE — G0439: CPT

## 2024-05-23 NOTE — HEALTH RISK ASSESSMENT
[Good] : ~his/her~  mood as  good [No] : No [0] : 2) Feeling down, depressed, or hopeless: Not at all (0) [Never] : Never [[id number]] : 0

## 2024-05-23 NOTE — HISTORY OF PRESENT ILLNESS
[FreeTextEntry1] : for transition care and annual exam [de-identified] : 78 year female came for transition care and annual exam Pt has h/o NIR, CAD,  Abib s/p ablation f/u by Sleep specialist and Cardiologist  Currently pt feels well, denies fever, headache, chest pain, palpitations, shortness of breath. Pt is asking to change her allergy list denies allergy to sulfa, allergic to sulfur, allergy list is updated

## 2024-06-20 ENCOUNTER — NON-APPOINTMENT (OUTPATIENT)
Age: 79
End: 2024-06-20

## 2024-07-30 ENCOUNTER — APPOINTMENT (OUTPATIENT)
Dept: GASTROENTEROLOGY | Facility: HOSPITAL | Age: 79
End: 2024-07-30

## 2024-09-02 ENCOUNTER — NON-APPOINTMENT (OUTPATIENT)
Age: 79
End: 2024-09-02

## 2024-09-04 ENCOUNTER — HOSPITAL ENCOUNTER (OUTPATIENT)
Dept: HOSPITAL 74 - FASU | Age: 79
Discharge: HOME | End: 2024-09-04
Attending: OPHTHALMOLOGY
Payer: COMMERCIAL

## 2024-09-04 VITALS — TEMPERATURE: 96.9 F | RESPIRATION RATE: 16 BRPM

## 2024-09-04 VITALS — DIASTOLIC BLOOD PRESSURE: 59 MMHG | SYSTOLIC BLOOD PRESSURE: 112 MMHG | HEART RATE: 62 BPM

## 2024-09-04 VITALS — BODY MASS INDEX: 29.7 KG/M2

## 2024-09-04 DIAGNOSIS — H26.8: Primary | ICD-10-CM

## 2024-09-04 PROCEDURE — V2632 POST CHMBR INTRAOCULAR LENS: HCPCS

## 2024-09-04 PROCEDURE — 08RJ3JZ REPLACEMENT OF RIGHT LENS WITH SYNTHETIC SUBSTITUTE, PERCUTANEOUS APPROACH: ICD-10-PCS | Performed by: OPHTHALMOLOGY

## 2024-09-04 PROCEDURE — 66984 XCAPSL CTRC RMVL W/O ECP: CPT

## 2024-09-04 RX ADMIN — CIPROFLOXACIN HYDROCHLORIDE ONE DROP: 3 SOLUTION/ DROPS OPHTHALMIC at 09:15

## 2024-09-04 RX ADMIN — TROPICAMIDE ONE DROP: 10 SOLUTION/ DROPS OPHTHALMIC at 09:15

## 2024-09-04 RX ADMIN — CYCLOPENTOLATE HYDROCHLORIDE ONE DROP: 20 SOLUTION/ DROPS OPHTHALMIC at 09:15

## 2024-09-04 RX ADMIN — PHENYLEPHRINE HYDROCHLORIDE ONE DROP: 25 SOLUTION/ DROPS OPHTHALMIC at 09:15

## 2024-09-11 ENCOUNTER — NON-APPOINTMENT (OUTPATIENT)
Age: 79
End: 2024-09-11

## 2024-10-01 ENCOUNTER — APPOINTMENT (OUTPATIENT)
Dept: INTERNAL MEDICINE | Facility: CLINIC | Age: 79
End: 2024-10-01
Payer: MEDICARE

## 2024-10-01 DIAGNOSIS — L71.9 ROSACEA, UNSPECIFIED: ICD-10-CM

## 2024-10-01 DIAGNOSIS — L20.9 ATOPIC DERMATITIS, UNSPECIFIED: ICD-10-CM

## 2024-10-01 PROCEDURE — 99213 OFFICE O/P EST LOW 20 MIN: CPT

## 2024-10-01 PROCEDURE — G2211 COMPLEX E/M VISIT ADD ON: CPT

## 2024-10-01 NOTE — HISTORY OF PRESENT ILLNESS
[Home] : at home, [unfilled] , at the time of the visit. [Medical Office: ([facility])___] : at the medical office located in  [Verbal consent obtained from patient] : the patient, [unfilled] [FreeTextEntry1] : for follow up  [de-identified] : 78 year female is evaluated via video visit for follow up few medical issues : 1 c/o flare up of eczema asking to refill topical cream which was px by her Derm, as per pt this tx was effective  2 h/o rosacea asking to refill topical gel, states tx is effective, denies SE of meds, planning to schedule f/u appt with Derm needs referral  Currently pt is not in acute distress, denies fever, headache, chest pain, palpitations, shortness of breath.

## 2024-10-01 NOTE — PHYSICAL EXAM
[No Acute Distress] : no acute distress [Alert and Oriented x3] : oriented to person, place, and time [de-identified] : rosacea,  eczema in elbow area

## 2025-01-24 ENCOUNTER — RESULT REVIEW (OUTPATIENT)
Age: 80
End: 2025-01-24

## 2025-01-27 ENCOUNTER — APPOINTMENT (OUTPATIENT)
Dept: CARDIOLOGY | Facility: CLINIC | Age: 80
End: 2025-01-27
Payer: MEDICARE

## 2025-01-27 ENCOUNTER — TRANSCRIPTION ENCOUNTER (OUTPATIENT)
Age: 80
End: 2025-01-27

## 2025-01-27 PROCEDURE — 36415 COLL VENOUS BLD VENIPUNCTURE: CPT

## 2025-01-29 ENCOUNTER — APPOINTMENT (OUTPATIENT)
Dept: CARDIOLOGY | Facility: CLINIC | Age: 80
End: 2025-01-29
Payer: MEDICARE

## 2025-01-29 ENCOUNTER — NON-APPOINTMENT (OUTPATIENT)
Age: 80
End: 2025-01-29

## 2025-01-29 VITALS
HEART RATE: 66 BPM | HEIGHT: 67 IN | WEIGHT: 193 LBS | DIASTOLIC BLOOD PRESSURE: 59 MMHG | SYSTOLIC BLOOD PRESSURE: 117 MMHG | BODY MASS INDEX: 30.29 KG/M2 | OXYGEN SATURATION: 99 % | RESPIRATION RATE: 14 BRPM

## 2025-01-29 DIAGNOSIS — R01.1 CARDIAC MURMUR, UNSPECIFIED: ICD-10-CM

## 2025-01-29 DIAGNOSIS — I48.0 PAROXYSMAL ATRIAL FIBRILLATION: ICD-10-CM

## 2025-01-29 DIAGNOSIS — I25.10 ATHEROSCLEROTIC HEART DISEASE OF NATIVE CORONARY ARTERY W/OUT ANGINA PECTORIS: ICD-10-CM

## 2025-01-29 DIAGNOSIS — E78.2 MIXED HYPERLIPIDEMIA: ICD-10-CM

## 2025-01-29 LAB
ALBUMIN SERPL ELPH-MCNC: 4.3 G/DL
ALP BLD-CCNC: 92 U/L
ALT SERPL-CCNC: 16 U/L
ANION GAP SERPL CALC-SCNC: 12 MMOL/L
AST SERPL-CCNC: 19 U/L
BILIRUB SERPL-MCNC: 0.2 MG/DL
BUN SERPL-MCNC: 15 MG/DL
CALCIUM SERPL-MCNC: 9.9 MG/DL
CHLORIDE SERPL-SCNC: 104 MMOL/L
CHOLEST SERPL-MCNC: 167 MG/DL
CO2 SERPL-SCNC: 23 MMOL/L
CREAT SERPL-MCNC: 0.81 MG/DL
EGFR: 75 ML/MIN/1.73M2
GLUCOSE SERPL-MCNC: 112 MG/DL
HCT VFR BLD CALC: 41.4 %
HDLC SERPL-MCNC: 63 MG/DL
HGB BLD-MCNC: 13.5 G/DL
LDLC SERPL CALC-MCNC: 80 MG/DL
MCHC RBC-ENTMCNC: 28.9 PG
MCHC RBC-ENTMCNC: 32.6 G/DL
MCV RBC AUTO: 88.7 FL
NONHDLC SERPL-MCNC: 104 MG/DL
PLATELET # BLD AUTO: 193 K/UL
POTASSIUM SERPL-SCNC: 4.6 MMOL/L
PROT SERPL-MCNC: 6.9 G/DL
RBC # BLD: 4.67 M/UL
RBC # FLD: 13.3 %
SODIUM SERPL-SCNC: 140 MMOL/L
TRIGL SERPL-MCNC: 135 MG/DL
WBC # FLD AUTO: 5.4 K/UL

## 2025-01-29 PROCEDURE — 99214 OFFICE O/P EST MOD 30 MIN: CPT

## 2025-01-29 PROCEDURE — 93000 ELECTROCARDIOGRAM COMPLETE: CPT

## 2025-02-24 ENCOUNTER — APPOINTMENT (OUTPATIENT)
Dept: CARDIOLOGY | Facility: CLINIC | Age: 80
End: 2025-02-24
Payer: MEDICARE

## 2025-02-24 DIAGNOSIS — I48.0 PAROXYSMAL ATRIAL FIBRILLATION: ICD-10-CM

## 2025-02-24 PROCEDURE — 93306 TTE W/DOPPLER COMPLETE: CPT

## 2025-05-06 ENCOUNTER — APPOINTMENT (OUTPATIENT)
Dept: FAMILY MEDICINE | Facility: CLINIC | Age: 80
End: 2025-05-06
Payer: MEDICARE

## 2025-05-06 VITALS
BODY MASS INDEX: 29.82 KG/M2 | TEMPERATURE: 97.7 F | HEART RATE: 73 BPM | HEIGHT: 67 IN | SYSTOLIC BLOOD PRESSURE: 122 MMHG | RESPIRATION RATE: 15 BRPM | OXYGEN SATURATION: 98 % | DIASTOLIC BLOOD PRESSURE: 70 MMHG | WEIGHT: 190 LBS

## 2025-05-06 DIAGNOSIS — T88.7XXA UNSPECIFIED ADVERSE EFFECT OF DRUG OR MEDICAMENT, INITIAL ENCOUNTER: ICD-10-CM

## 2025-05-06 DIAGNOSIS — L66.12 FRONTAL FIBROSING ALOPECIA: ICD-10-CM

## 2025-05-06 DIAGNOSIS — R73.03 PREDIABETES.: ICD-10-CM

## 2025-05-06 DIAGNOSIS — R19.5 OTHER FECAL ABNORMALITIES: ICD-10-CM

## 2025-05-06 DIAGNOSIS — I48.0 PAROXYSMAL ATRIAL FIBRILLATION: ICD-10-CM

## 2025-05-06 DIAGNOSIS — Z23 ENCOUNTER FOR IMMUNIZATION: ICD-10-CM

## 2025-05-06 DIAGNOSIS — Z86.59 PERSONAL HISTORY OF OTHER MENTAL AND BEHAVIORAL DISORDERS: ICD-10-CM

## 2025-05-06 DIAGNOSIS — Z86.79 PERSONAL HISTORY OF OTHER DISEASES OF THE CIRCULATORY SYSTEM: ICD-10-CM

## 2025-05-06 DIAGNOSIS — H40.9 UNSPECIFIED GLAUCOMA: ICD-10-CM

## 2025-05-06 DIAGNOSIS — Z87.19 PERSONAL HISTORY OF OTHER DISEASES OF THE DIGESTIVE SYSTEM: ICD-10-CM

## 2025-05-06 DIAGNOSIS — Z76.89 PERSONS ENCOUNTERING HEALTH SERVICES IN OTHER SPECIFIED CIRCUMSTANCES: ICD-10-CM

## 2025-05-06 DIAGNOSIS — Z92.29 PERSONAL HISTORY OF OTHER DRUG THERAPY: ICD-10-CM

## 2025-05-06 DIAGNOSIS — M77.8 OTHER ENTHESOPATHIES, NOT ELSEWHERE CLASSIFIED: ICD-10-CM

## 2025-05-06 DIAGNOSIS — M81.0 AGE-RELATED OSTEOPOROSIS W/OUT CURRENT PATHOLOGICAL FRACTURE: ICD-10-CM

## 2025-05-06 DIAGNOSIS — G47.33 OBSTRUCTIVE SLEEP APNEA (ADULT) (PEDIATRIC): ICD-10-CM

## 2025-05-06 DIAGNOSIS — Z12.11 ENCOUNTER FOR SCREENING FOR MALIGNANT NEOPLASM OF COLON: ICD-10-CM

## 2025-05-06 DIAGNOSIS — R94.39 ABNORMAL RESULT OF OTHER CARDIOVASCULAR FUNCTION STUDY: ICD-10-CM

## 2025-05-06 DIAGNOSIS — R21 RASH AND OTHER NONSPECIFIC SKIN ERUPTION: ICD-10-CM

## 2025-05-06 DIAGNOSIS — E78.2 MIXED HYPERLIPIDEMIA: ICD-10-CM

## 2025-05-06 DIAGNOSIS — I25.10 ATHEROSCLEROTIC HEART DISEASE OF NATIVE CORONARY ARTERY W/OUT ANGINA PECTORIS: ICD-10-CM

## 2025-05-06 PROCEDURE — G2211 COMPLEX E/M VISIT ADD ON: CPT

## 2025-05-06 PROCEDURE — 99205 OFFICE O/P NEW HI 60 MIN: CPT

## 2025-05-09 PROBLEM — Z86.59 HISTORY OF ANXIETY: Status: RESOLVED | Noted: 2020-07-14 | Resolved: 2025-05-09

## 2025-05-09 PROBLEM — Z76.89 ENCOUNTER TO ESTABLISH CARE: Status: ACTIVE | Noted: 2025-05-09

## 2025-05-09 PROBLEM — Z86.79 HISTORY OF ABNORMAL ELECTROCARDIOGRAPHY: Status: RESOLVED | Noted: 2018-07-20 | Resolved: 2025-05-09

## 2025-06-10 ENCOUNTER — APPOINTMENT (OUTPATIENT)
Dept: FAMILY MEDICINE | Facility: CLINIC | Age: 80
End: 2025-06-10
Payer: MEDICARE

## 2025-06-10 VITALS
SYSTOLIC BLOOD PRESSURE: 118 MMHG | HEART RATE: 67 BPM | TEMPERATURE: 97.6 F | HEIGHT: 67 IN | OXYGEN SATURATION: 98 % | DIASTOLIC BLOOD PRESSURE: 76 MMHG | BODY MASS INDEX: 31.23 KG/M2 | WEIGHT: 199 LBS | RESPIRATION RATE: 16 BRPM

## 2025-06-10 PROCEDURE — G0439: CPT

## 2025-06-10 PROCEDURE — 36415 COLL VENOUS BLD VENIPUNCTURE: CPT

## 2025-06-10 RX ORDER — METRONIDAZOLE 7.5 MG/G
0.75 GEL TOPICAL
Refills: 0 | Status: ACTIVE | COMMUNITY

## 2025-06-10 RX ORDER — MULTIVIT WITH MIN/ALA/HERBS 50 MG
TABLET ORAL
Refills: 0 | Status: ACTIVE | COMMUNITY

## 2025-06-15 LAB
25(OH)D3 SERPL-MCNC: 31.4 NG/ML
ESTIMATED AVERAGE GLUCOSE: 117 MG/DL
HBA1C MFR BLD HPLC: 5.7 %
TSH SERPL-ACNC: 2.18 UIU/ML
VIT B12 SERPL-MCNC: 1108 PG/ML

## 2025-07-07 ENCOUNTER — APPOINTMENT (OUTPATIENT)
Dept: CARDIOLOGY | Facility: CLINIC | Age: 80
End: 2025-07-07
Payer: MEDICARE

## 2025-07-07 VITALS
SYSTOLIC BLOOD PRESSURE: 138 MMHG | OXYGEN SATURATION: 94 % | WEIGHT: 200.7 LBS | RESPIRATION RATE: 14 BRPM | BODY MASS INDEX: 31.43 KG/M2 | HEART RATE: 83 BPM | DIASTOLIC BLOOD PRESSURE: 82 MMHG

## 2025-07-07 PROCEDURE — 99214 OFFICE O/P EST MOD 30 MIN: CPT

## 2025-07-07 PROCEDURE — 93000 ELECTROCARDIOGRAM COMPLETE: CPT

## 2025-07-16 ENCOUNTER — RESULT REVIEW (OUTPATIENT)
Age: 80
End: 2025-07-16

## 2025-07-19 ENCOUNTER — TRANSCRIPTION ENCOUNTER (OUTPATIENT)
Age: 80
End: 2025-07-19